# Patient Record
Sex: MALE | Race: WHITE | NOT HISPANIC OR LATINO | Employment: OTHER | URBAN - METROPOLITAN AREA
[De-identification: names, ages, dates, MRNs, and addresses within clinical notes are randomized per-mention and may not be internally consistent; named-entity substitution may affect disease eponyms.]

---

## 2023-04-07 ENCOUNTER — TELEPHONE (OUTPATIENT)
Dept: PRIMARY CARE | Facility: CLINIC | Age: 68
End: 2023-04-07
Payer: MEDICARE

## 2023-04-07 DIAGNOSIS — G47.33 OSA AND COPD OVERLAP SYNDROME (MULTI): Primary | ICD-10-CM

## 2023-04-07 DIAGNOSIS — J44.9 OSA AND COPD OVERLAP SYNDROME (MULTI): Primary | ICD-10-CM

## 2023-04-25 ENCOUNTER — TELEPHONE (OUTPATIENT)
Dept: PRIMARY CARE | Facility: CLINIC | Age: 68
End: 2023-04-25
Payer: MEDICARE

## 2023-04-25 DIAGNOSIS — E03.9 ACQUIRED HYPOTHYROIDISM: Primary | ICD-10-CM

## 2023-04-25 PROBLEM — I25.10 ATHEROSCLEROSIS OF CORONARY ARTERY: Status: ACTIVE | Noted: 2023-04-25

## 2023-04-25 PROBLEM — R05.9 COUGH: Status: ACTIVE | Noted: 2023-04-25

## 2023-04-25 PROBLEM — R09.82 PND (POST-NASAL DRIP): Status: ACTIVE | Noted: 2023-04-25

## 2023-04-25 PROBLEM — I51.9 LEFT VENTRICULAR DYSFUNCTION: Status: ACTIVE | Noted: 2023-04-25

## 2023-04-25 PROBLEM — E53.8 VITAMIN B12 DEFICIENCY: Status: ACTIVE | Noted: 2023-04-25

## 2023-04-25 PROBLEM — I34.0 NON-RHEUMATIC MITRAL REGURGITATION: Status: ACTIVE | Noted: 2023-04-25

## 2023-04-25 PROBLEM — J01.90 SINUSITIS, ACUTE: Status: ACTIVE | Noted: 2023-04-25

## 2023-04-25 PROBLEM — R73.02 IGT (IMPAIRED GLUCOSE TOLERANCE): Status: ACTIVE | Noted: 2023-04-25

## 2023-04-25 PROBLEM — N40.0 BPH WITHOUT URINARY OBSTRUCTION: Status: ACTIVE | Noted: 2023-04-25

## 2023-04-25 PROBLEM — E55.9 VITAMIN D DEFICIENCY: Status: ACTIVE | Noted: 2023-04-25

## 2023-04-25 PROBLEM — J43.2 CENTRILOBULAR EMPHYSEMA (MULTI): Status: ACTIVE | Noted: 2023-04-25

## 2023-04-25 PROBLEM — E78.5 DYSLIPIDEMIA, GOAL LDL BELOW 70: Status: ACTIVE | Noted: 2023-04-25

## 2023-04-25 PROBLEM — K21.9 CHRONIC GERD: Status: ACTIVE | Noted: 2023-04-25

## 2023-04-25 PROBLEM — M05.79 RHEUMATOID ARTHRITIS INVOLVING MULTIPLE SITES WITH POSITIVE RHEUMATOID FACTOR (MULTI): Status: ACTIVE | Noted: 2023-04-25

## 2023-04-25 RX ORDER — ASPIRIN 81 MG/1
1 TABLET ORAL DAILY
COMMUNITY
Start: 2017-02-16

## 2023-04-25 RX ORDER — IBANDRONATE SODIUM 150 MG/1
TABLET, FILM COATED ORAL
COMMUNITY
Start: 2021-08-18

## 2023-04-25 RX ORDER — OMEGA-3S/DHA/EPA/FISH OIL/D3 300MG-1000
150 CAPSULE ORAL DAILY
COMMUNITY
End: 2023-08-15 | Stop reason: SDUPTHER

## 2023-04-25 RX ORDER — ALBUTEROL SULFATE 90 UG/1
2 AEROSOL, METERED RESPIRATORY (INHALATION) EVERY 4 HOURS PRN
COMMUNITY
Start: 2017-02-16 | End: 2024-05-14 | Stop reason: SDUPTHER

## 2023-04-25 RX ORDER — FAMOTIDINE 40 MG/1
1 TABLET, FILM COATED ORAL NIGHTLY
COMMUNITY
Start: 2022-10-12 | End: 2023-05-11 | Stop reason: SDUPTHER

## 2023-04-25 RX ORDER — LEVOTHYROXINE SODIUM 25 UG/1
1 TABLET ORAL DAILY
COMMUNITY
Start: 2020-03-19 | End: 2023-04-25 | Stop reason: SDUPTHER

## 2023-04-25 RX ORDER — HYDROXYCHLOROQUINE SULFATE 200 MG/1
TABLET ORAL 2 TIMES DAILY
COMMUNITY
Start: 2017-02-16

## 2023-04-25 RX ORDER — METHOTREXATE 2.5 MG/1
15 TABLET ORAL
COMMUNITY
Start: 2017-02-16

## 2023-04-25 RX ORDER — LEVOTHYROXINE SODIUM 25 UG/1
25 TABLET ORAL DAILY
Qty: 90 TABLET | Refills: 3 | Status: SHIPPED | OUTPATIENT
Start: 2023-04-25 | End: 2024-05-14 | Stop reason: WASHOUT

## 2023-04-25 RX ORDER — FUROSEMIDE 20 MG/1
1 TABLET ORAL DAILY
COMMUNITY
Start: 2017-02-16 | End: 2023-05-11 | Stop reason: SDUPTHER

## 2023-04-25 RX ORDER — FOLIC ACID 1 MG/1
1 TABLET ORAL DAILY
COMMUNITY
Start: 2017-02-16

## 2023-04-25 RX ORDER — VITAMIN B COMPLEX
1 TABLET ORAL DAILY
COMMUNITY
End: 2023-08-11 | Stop reason: ALTCHOICE

## 2023-04-25 RX ORDER — NITROGLYCERIN 0.4 MG/1
TABLET SUBLINGUAL
COMMUNITY
Start: 2017-02-16 | End: 2023-05-11 | Stop reason: SDUPTHER

## 2023-04-25 RX ORDER — EZETIMIBE AND SIMVASTATIN 10; 40 MG/1; MG/1
1 TABLET ORAL NIGHTLY
COMMUNITY
Start: 2017-02-16 | End: 2023-08-11 | Stop reason: SDUPTHER

## 2023-04-25 RX ORDER — CARVEDILOL 12.5 MG/1
1 TABLET ORAL 2 TIMES DAILY
COMMUNITY
Start: 2017-02-16 | End: 2023-11-14 | Stop reason: SDUPTHER

## 2023-04-25 RX ORDER — FLUTICASONE FUROATE, UMECLIDINIUM BROMIDE AND VILANTEROL TRIFENATATE 200; 62.5; 25 UG/1; UG/1; UG/1
POWDER RESPIRATORY (INHALATION)
COMMUNITY
Start: 2021-11-22 | End: 2024-01-08 | Stop reason: SDUPTHER

## 2023-05-03 RX ORDER — LEVOTHYROXINE SODIUM 150 UG/1
150 TABLET ORAL DAILY
Qty: 90 TABLET | Refills: 3 | Status: SHIPPED | OUTPATIENT
Start: 2023-05-03 | End: 2024-01-08 | Stop reason: SDUPTHER

## 2023-05-03 NOTE — TELEPHONE ENCOUNTER
He needs a the Levothyroxine 150 mg takes it 1 time a day please send to [facility] mail order his pharmacy was not able to fill the other one cause he don't need that one right now.

## 2023-05-04 LAB
FERRITIN (UG/LL) IN SER/PLAS: 60 UG/L (ref 20–300)
IRON (UG/DL) IN SER/PLAS: 65 UG/DL (ref 35–150)
IRON BINDING CAPACITY (UG/DL) IN SER/PLAS: 318 UG/DL (ref 240–445)
IRON SATURATION (%) IN SER/PLAS: 20 % (ref 25–45)

## 2023-05-09 PROBLEM — G47.34 NOCTURNAL HYPOXEMIA: Status: ACTIVE | Noted: 2023-05-09

## 2023-05-09 PROBLEM — I25.10 CORONARY ARTERY DISEASE INVOLVING NATIVE CORONARY ARTERY OF NATIVE HEART WITHOUT ANGINA PECTORIS: Status: ACTIVE | Noted: 2023-05-09

## 2023-05-09 PROBLEM — G47.33 OSA (OBSTRUCTIVE SLEEP APNEA): Status: ACTIVE | Noted: 2023-05-09

## 2023-05-09 PROBLEM — G47.19 DAYTIME HYPERSOMNOLENCE: Status: ACTIVE | Noted: 2023-05-09

## 2023-05-09 PROBLEM — G25.81 RESTLESS LEGS SYNDROME: Status: ACTIVE | Noted: 2023-05-09

## 2023-05-09 PROBLEM — R79.0 ABNORMAL BLOOD LEVEL OF IRON: Status: ACTIVE | Noted: 2023-05-09

## 2023-05-09 RX ORDER — FLUTICASONE PROPIONATE 50 MCG
2 SPRAY, SUSPENSION (ML) NASAL DAILY
COMMUNITY
Start: 2021-11-22 | End: 2024-05-14 | Stop reason: SDUPTHER

## 2023-05-11 ENCOUNTER — OFFICE VISIT (OUTPATIENT)
Dept: PRIMARY CARE | Facility: CLINIC | Age: 68
End: 2023-05-11
Payer: COMMERCIAL

## 2023-05-11 ENCOUNTER — TELEPHONE (OUTPATIENT)
Dept: PRIMARY CARE | Facility: CLINIC | Age: 68
End: 2023-05-11

## 2023-05-11 VITALS
BODY MASS INDEX: 34.36 KG/M2 | DIASTOLIC BLOOD PRESSURE: 80 MMHG | WEIGHT: 240 LBS | RESPIRATION RATE: 16 BRPM | HEART RATE: 68 BPM | SYSTOLIC BLOOD PRESSURE: 130 MMHG | HEIGHT: 70 IN

## 2023-05-11 DIAGNOSIS — G47.19 DAYTIME HYPERSOMNOLENCE: ICD-10-CM

## 2023-05-11 DIAGNOSIS — J43.2 CENTRILOBULAR EMPHYSEMA (MULTI): ICD-10-CM

## 2023-05-11 DIAGNOSIS — G47.33 OSA (OBSTRUCTIVE SLEEP APNEA): Primary | ICD-10-CM

## 2023-05-11 DIAGNOSIS — G47.34 NOCTURNAL HYPOXEMIA: ICD-10-CM

## 2023-05-11 DIAGNOSIS — M05.79 RHEUMATOID ARTHRITIS INVOLVING MULTIPLE SITES WITH POSITIVE RHEUMATOID FACTOR (MULTI): ICD-10-CM

## 2023-05-11 DIAGNOSIS — I25.10 CORONARY ARTERY DISEASE INVOLVING NATIVE CORONARY ARTERY OF NATIVE HEART WITHOUT ANGINA PECTORIS: ICD-10-CM

## 2023-05-11 PROBLEM — R79.0 ABNORMAL BLOOD LEVEL OF IRON: Status: RESOLVED | Noted: 2023-05-09 | Resolved: 2023-05-11

## 2023-05-11 PROBLEM — J01.90 SINUSITIS, ACUTE: Status: RESOLVED | Noted: 2023-04-25 | Resolved: 2023-05-11

## 2023-05-11 PROBLEM — R05.9 COUGH: Status: RESOLVED | Noted: 2023-04-25 | Resolved: 2023-05-11

## 2023-05-11 PROBLEM — G25.81 RESTLESS LEGS SYNDROME: Status: RESOLVED | Noted: 2023-05-09 | Resolved: 2023-05-11

## 2023-05-11 PROCEDURE — 99213 OFFICE O/P EST LOW 20 MIN: CPT | Performed by: INTERNAL MEDICINE

## 2023-05-11 PROCEDURE — 3008F BODY MASS INDEX DOCD: CPT | Performed by: INTERNAL MEDICINE

## 2023-05-11 PROCEDURE — 1036F TOBACCO NON-USER: CPT | Performed by: INTERNAL MEDICINE

## 2023-05-11 PROCEDURE — 1160F RVW MEDS BY RX/DR IN RCRD: CPT | Performed by: INTERNAL MEDICINE

## 2023-05-11 PROCEDURE — 1159F MED LIST DOCD IN RCRD: CPT | Performed by: INTERNAL MEDICINE

## 2023-05-11 RX ORDER — NITROGLYCERIN 0.4 MG/1
0.4 TABLET SUBLINGUAL EVERY 5 MIN PRN
Qty: 90 TABLET | Refills: 3 | Status: CANCELLED | OUTPATIENT
Start: 2023-05-11

## 2023-05-11 RX ORDER — FUROSEMIDE 20 MG/1
20 TABLET ORAL DAILY
Qty: 90 TABLET | Refills: 3 | Status: SHIPPED | OUTPATIENT
Start: 2023-05-11

## 2023-05-11 RX ORDER — FAMOTIDINE 40 MG/1
40 TABLET, FILM COATED ORAL NIGHTLY
Qty: 90 TABLET | Refills: 3 | Status: SHIPPED | OUTPATIENT
Start: 2023-05-11 | End: 2024-05-14 | Stop reason: SDUPTHER

## 2023-05-11 RX ORDER — FAMOTIDINE 40 MG/1
40 TABLET, FILM COATED ORAL NIGHTLY
Qty: 90 TABLET | Refills: 3 | Status: CANCELLED | OUTPATIENT
Start: 2023-05-11

## 2023-05-11 RX ORDER — FUROSEMIDE 20 MG/1
20 TABLET ORAL DAILY
Qty: 90 TABLET | Refills: 3 | Status: CANCELLED | OUTPATIENT
Start: 2023-05-11

## 2023-05-11 RX ORDER — NITROGLYCERIN 0.4 MG/1
0.4 TABLET SUBLINGUAL EVERY 5 MIN PRN
Qty: 90 TABLET | Refills: 3 | Status: SHIPPED | OUTPATIENT
Start: 2023-05-11 | End: 2024-05-14 | Stop reason: WASHOUT

## 2023-05-11 ASSESSMENT — ANXIETY QUESTIONNAIRES
6. BECOMING EASILY ANNOYED OR IRRITABLE: NOT AT ALL
IF YOU CHECKED OFF ANY PROBLEMS ON THIS QUESTIONNAIRE, HOW DIFFICULT HAVE THESE PROBLEMS MADE IT FOR YOU TO DO YOUR WORK, TAKE CARE OF THINGS AT HOME, OR GET ALONG WITH OTHER PEOPLE: NOT DIFFICULT AT ALL
7. FEELING AFRAID AS IF SOMETHING AWFUL MIGHT HAPPEN: NOT AT ALL
2. NOT BEING ABLE TO STOP OR CONTROL WORRYING: NOT AT ALL
4. TROUBLE RELAXING: NOT AT ALL
GAD7 TOTAL SCORE: 0
5. BEING SO RESTLESS THAT IT IS HARD TO SIT STILL: NOT AT ALL
3. WORRYING TOO MUCH ABOUT DIFFERENT THINGS: NOT AT ALL
1. FEELING NERVOUS, ANXIOUS, OR ON EDGE: NOT AT ALL

## 2023-05-11 ASSESSMENT — PATIENT HEALTH QUESTIONNAIRE - PHQ9
1. LITTLE INTEREST OR PLEASURE IN DOING THINGS: NOT AT ALL
2. FEELING DOWN, DEPRESSED OR HOPELESS: NOT AT ALL
SUM OF ALL RESPONSES TO PHQ9 QUESTIONS 1 AND 2: 0

## 2023-05-11 ASSESSMENT — ENCOUNTER SYMPTOMS
OCCASIONAL FEELINGS OF UNSTEADINESS: 0
DEPRESSION: 0
LOSS OF SENSATION IN FEET: 0

## 2023-05-11 NOTE — PROGRESS NOTES
"Subjective   Patient ID: Artemio Powell is a 68 y.o. male who presents for Follow-up and sleep study results .    HPI     Review of Systems    Objective   /80   Pulse 68   Resp 16   Ht 1.778 m (5' 10\")   Wt 109 kg (240 lb)   BMI 34.44 kg/m²     Physical Exam    Assessment/Plan          "

## 2023-05-11 NOTE — PROGRESS NOTES
"Subjective   Reason for Visit: Artemio Powell is an 68 y.o. male here for a  visit.     Past Medical, Surgical, and Family History reviewed and updated in chart.    Reviewed all medications by prescribing practitioner or clinical pharmacist (such as prescriptions, OTCs, herbal therapies and supplements) and documented in the medical record.    History of daytime hypersomnolence with established COPD patient sent for sleep study patient has significant sleep related hypoxia and consistent with a diagnosis of moderate obstructive sleep apnea respiratory events were worse in REM sleep and supine sleep with the katie oxygen of 82% requiring patient to have 1 L nasal cannula bleeding patient will be scheduled for CPAP he may require a titration study or may benefit from a formal in-house sleep study he was screened for restless legs but is more periodic limb movement disorder related to sleep apnea iron levels were within normal limits as well as ferritin levels        Patient Care Team:  David Michaels DO as PCP - General     Review of Systems   All other systems reviewed and are negative.      Objective   Vitals:  /80   Pulse 68   Resp 16   Ht 1.778 m (5' 10\")   Wt 109 kg (240 lb)   BMI 34.44 kg/m²       Physical Exam  Vitals and nursing note reviewed.   Constitutional:       General: He is not in acute distress.     Appearance: Normal appearance. He is well-developed. He is not toxic-appearing.   HENT:      Head: Normocephalic and atraumatic.      Right Ear: Tympanic membrane and external ear normal.      Left Ear: Tympanic membrane and external ear normal.      Nose: Nose normal.      Mouth/Throat:      Mouth: Mucous membranes are moist.      Pharynx: Oropharynx is clear. No oropharyngeal exudate or posterior oropharyngeal erythema.      Tonsils: No tonsillar exudate. 2+ on the right. 2+ on the left.   Eyes:      Extraocular Movements: Extraocular movements intact.      Conjunctiva/sclera: " Conjunctivae normal.   Cardiovascular:      Rate and Rhythm: Normal rate and regular rhythm.      Pulses: Normal pulses.      Heart sounds: Normal heart sounds. No murmur heard.  Pulmonary:      Effort: Pulmonary effort is normal.      Breath sounds: Normal breath sounds.   Abdominal:      General: Abdomen is flat. Bowel sounds are normal.      Palpations: Abdomen is soft.   Musculoskeletal:      Cervical back: Neck supple.   Feet:      Right foot:      Skin integrity: Skin integrity normal. No ulcer, blister, skin breakdown, erythema, warmth or callus.      Toenail Condition: Right toenails are normal.      Left foot:      Skin integrity: Skin integrity normal. No ulcer, blister, skin breakdown, erythema, warmth or callus.      Toenail Condition: Left toenails are normal.   Lymphadenopathy:      Cervical: No cervical adenopathy.   Skin:     General: Skin is warm and dry.      Capillary Refill: Capillary refill takes more than 3 seconds.      Findings: No rash.   Neurological:      Mental Status: He is alert. Mental status is at baseline.      Sensory: Sensation is intact.   Psychiatric:         Mood and Affect: Mood normal.         Behavior: Behavior normal.         Thought Content: Thought content normal.         Judgment: Judgment normal.         Assessment/Plan   Problem List Items Addressed This Visit          Nervous    Daytime hypersomnolence    RAYMUNDO (obstructive sleep apnea) - Primary    Current Assessment & Plan     Schedule an AutoPap of 5-20 with 1 L nasal cannula O2 bleed in his should improve periodic limb movement disorder reevaluate in 3 months            Respiratory    Centrilobular emphysema (CMS/HCC)    Current Assessment & Plan     Stable at this time continue therapy with inhalers         Relevant Medications    famotidine (Pepcid) 40 mg tablet    furosemide (Lasix) 20 mg tablet    nitroglycerin (Nitrostat) 0.4 mg SL tablet    Other Relevant Orders    Disability Placard       Circulatory     Coronary artery disease involving native coronary artery of native heart without angina pectoris    Current Assessment & Plan     Stable blood pressures stable without angina          Relevant Medications    nitroglycerin (Nitrostat) 0.4 mg SL tablet       Other    Rheumatoid arthritis involving multiple sites with positive rheumatoid factor (CMS/[facility])    Current Assessment & Plan     Managed by rheumatology with monitoring of labs on Renflexis 100 mg injection         Relevant Medications    famotidine (Pepcid) 40 mg tablet    furosemide (Lasix) 20 mg tablet    nitroglycerin (Nitrostat) 0.4 mg SL tablet    Other Relevant Orders    Disability Placard

## 2023-05-11 NOTE — ASSESSMENT & PLAN NOTE
Schedule an AutoPap of 5-20 with 1 L nasal cannula O2 bleed in his should improve periodic limb movement disorder reevaluate in 3 months

## 2023-05-11 NOTE — TELEPHONE ENCOUNTER
Fina from [facility] called states that if oxygen is needed to bled through the cpap machine patient has to have a cpap titration done. But if no oxygen is needed then they can get him set up with pap.

## 2023-08-11 ENCOUNTER — OFFICE VISIT (OUTPATIENT)
Dept: PRIMARY CARE | Facility: CLINIC | Age: 68
End: 2023-08-11
Payer: COMMERCIAL

## 2023-08-11 VITALS
SYSTOLIC BLOOD PRESSURE: 124 MMHG | BODY MASS INDEX: 34.88 KG/M2 | WEIGHT: 243.6 LBS | OXYGEN SATURATION: 92 % | HEART RATE: 72 BPM | HEIGHT: 70 IN | DIASTOLIC BLOOD PRESSURE: 80 MMHG

## 2023-08-11 DIAGNOSIS — I11.0 HYPERTENSIVE HEART DISEASE WITH CHRONIC COMBINED SYSTOLIC AND DIASTOLIC CONGESTIVE HEART FAILURE (MULTI): ICD-10-CM

## 2023-08-11 DIAGNOSIS — E06.3 HYPOTHYROIDISM DUE TO HASHIMOTO'S THYROIDITIS: ICD-10-CM

## 2023-08-11 DIAGNOSIS — E66.09 CLASS 1 OBESITY DUE TO EXCESS CALORIES WITH SERIOUS COMORBIDITY AND BODY MASS INDEX (BMI) OF 34.0 TO 34.9 IN ADULT: ICD-10-CM

## 2023-08-11 DIAGNOSIS — E03.8 HYPOTHYROIDISM DUE TO HASHIMOTO'S THYROIDITIS: ICD-10-CM

## 2023-08-11 DIAGNOSIS — I25.10 CORONARY ARTERY DISEASE INVOLVING NATIVE CORONARY ARTERY OF NATIVE HEART WITHOUT ANGINA PECTORIS: ICD-10-CM

## 2023-08-11 DIAGNOSIS — I50.42 HYPERTENSIVE HEART DISEASE WITH CHRONIC COMBINED SYSTOLIC AND DIASTOLIC CONGESTIVE HEART FAILURE (MULTI): ICD-10-CM

## 2023-08-11 DIAGNOSIS — G47.33 OSA ON CPAP: ICD-10-CM

## 2023-08-11 DIAGNOSIS — E55.9 VITAMIN D DEFICIENCY: ICD-10-CM

## 2023-08-11 DIAGNOSIS — J44.9 OSA AND COPD OVERLAP SYNDROME (MULTI): Primary | ICD-10-CM

## 2023-08-11 DIAGNOSIS — M05.79 RHEUMATOID ARTHRITIS INVOLVING MULTIPLE SITES WITH POSITIVE RHEUMATOID FACTOR (MULTI): ICD-10-CM

## 2023-08-11 DIAGNOSIS — R73.02 IGT (IMPAIRED GLUCOSE TOLERANCE): ICD-10-CM

## 2023-08-11 DIAGNOSIS — G47.33 OSA AND COPD OVERLAP SYNDROME (MULTI): Primary | ICD-10-CM

## 2023-08-11 DIAGNOSIS — J43.2 CENTRILOBULAR EMPHYSEMA (MULTI): ICD-10-CM

## 2023-08-11 DIAGNOSIS — E78.5 DYSLIPIDEMIA, GOAL LDL BELOW 70: ICD-10-CM

## 2023-08-11 DIAGNOSIS — E53.8 VITAMIN B12 DEFICIENCY: ICD-10-CM

## 2023-08-11 PROBLEM — G47.34 NOCTURNAL HYPOXEMIA: Status: RESOLVED | Noted: 2023-05-09 | Resolved: 2023-08-11

## 2023-08-11 PROBLEM — E66.9 CLASS 1 OBESITY WITH SERIOUS COMORBIDITY AND BODY MASS INDEX (BMI) OF 34.0 TO 34.9 IN ADULT: Status: ACTIVE | Noted: 2023-08-11

## 2023-08-11 PROBLEM — G47.19 DAYTIME HYPERSOMNOLENCE: Status: RESOLVED | Noted: 2023-05-09 | Resolved: 2023-08-11

## 2023-08-11 PROBLEM — E66.811 CLASS 1 OBESITY WITH SERIOUS COMORBIDITY AND BODY MASS INDEX (BMI) OF 34.0 TO 34.9 IN ADULT: Status: ACTIVE | Noted: 2023-08-11

## 2023-08-11 PROBLEM — I51.9 LEFT VENTRICULAR DYSFUNCTION: Status: RESOLVED | Noted: 2023-04-25 | Resolved: 2023-08-11

## 2023-08-11 PROCEDURE — 1159F MED LIST DOCD IN RCRD: CPT | Performed by: INTERNAL MEDICINE

## 2023-08-11 PROCEDURE — 90677 PCV20 VACCINE IM: CPT | Performed by: INTERNAL MEDICINE

## 2023-08-11 PROCEDURE — 3008F BODY MASS INDEX DOCD: CPT | Performed by: INTERNAL MEDICINE

## 2023-08-11 PROCEDURE — 99214 OFFICE O/P EST MOD 30 MIN: CPT | Performed by: INTERNAL MEDICINE

## 2023-08-11 PROCEDURE — 90750 HZV VACC RECOMBINANT IM: CPT | Performed by: INTERNAL MEDICINE

## 2023-08-11 PROCEDURE — 90472 IMMUNIZATION ADMIN EACH ADD: CPT | Performed by: INTERNAL MEDICINE

## 2023-08-11 PROCEDURE — 1036F TOBACCO NON-USER: CPT | Performed by: INTERNAL MEDICINE

## 2023-08-11 PROCEDURE — 1160F RVW MEDS BY RX/DR IN RCRD: CPT | Performed by: INTERNAL MEDICINE

## 2023-08-11 PROCEDURE — 90471 IMMUNIZATION ADMIN: CPT | Performed by: INTERNAL MEDICINE

## 2023-08-11 RX ORDER — LANOLIN ALCOHOL/MO/W.PET/CERES
1000 CREAM (GRAM) TOPICAL DAILY
Qty: 90 TABLET | Refills: 3 | Status: SHIPPED | OUTPATIENT
Start: 2023-08-11 | End: 2023-08-24 | Stop reason: SDUPTHER

## 2023-08-11 RX ORDER — EZETIMIBE AND SIMVASTATIN 10; 40 MG/1; MG/1
1 TABLET ORAL NIGHTLY
Qty: 90 TABLET | Refills: 3 | Status: SHIPPED | OUTPATIENT
Start: 2023-08-11 | End: 2024-08-10

## 2023-08-11 ASSESSMENT — PATIENT HEALTH QUESTIONNAIRE - PHQ9
2. FEELING DOWN, DEPRESSED OR HOPELESS: NOT AT ALL
1. LITTLE INTEREST OR PLEASURE IN DOING THINGS: NOT AT ALL
SUM OF ALL RESPONSES TO PHQ9 QUESTIONS 1 AND 2: 0

## 2023-08-11 ASSESSMENT — ANXIETY QUESTIONNAIRES
1. FEELING NERVOUS, ANXIOUS, OR ON EDGE: NOT AT ALL
6. BECOMING EASILY ANNOYED OR IRRITABLE: NOT AT ALL
GAD7 TOTAL SCORE: 0
5. BEING SO RESTLESS THAT IT IS HARD TO SIT STILL: NOT AT ALL
3. WORRYING TOO MUCH ABOUT DIFFERENT THINGS: NOT AT ALL
2. NOT BEING ABLE TO STOP OR CONTROL WORRYING: NOT AT ALL
4. TROUBLE RELAXING: NOT AT ALL
IF YOU CHECKED OFF ANY PROBLEMS ON THIS QUESTIONNAIRE, HOW DIFFICULT HAVE THESE PROBLEMS MADE IT FOR YOU TO DO YOUR WORK, TAKE CARE OF THINGS AT HOME, OR GET ALONG WITH OTHER PEOPLE: NOT DIFFICULT AT ALL
7. FEELING AFRAID AS IF SOMETHING AWFUL MIGHT HAPPEN: NOT AT ALL

## 2023-08-11 ASSESSMENT — ENCOUNTER SYMPTOMS
OCCASIONAL FEELINGS OF UNSTEADINESS: 0
SHORTNESS OF BREATH: 1
DEPRESSION: 0
LOSS OF SENSATION IN FEET: 0

## 2023-08-11 ASSESSMENT — COLUMBIA-SUICIDE SEVERITY RATING SCALE - C-SSRS
1. IN THE PAST MONTH, HAVE YOU WISHED YOU WERE DEAD OR WISHED YOU COULD GO TO SLEEP AND NOT WAKE UP?: NO
6. HAVE YOU EVER DONE ANYTHING, STARTED TO DO ANYTHING, OR PREPARED TO DO ANYTHING TO END YOUR LIFE?: NO
2. HAVE YOU ACTUALLY HAD ANY THOUGHTS OF KILLING YOURSELF?: NO

## 2023-08-11 ASSESSMENT — COPD QUESTIONNAIRES: COPD: 1

## 2023-08-11 NOTE — ASSESSMENT & PLAN NOTE
Patient continues on Trelegy inhaler once a day with bronchodilator therapy for rescue review previous PFTs and reorder PFTs with 6-minute exercise test to evaluate further exertional dyspnea related to respiratory cause.  Did update Prevnar 20 vaccination and first Shingrix vaccination in office today reevaluate in 3 months

## 2023-08-11 NOTE — ASSESSMENT & PLAN NOTE
Well compensated on carvedilol 12.5 mg twice a day and furosemide 20 mg daily discontinued ACE inhibitor secondary to low blood pressure continue to monitor closely

## 2023-08-11 NOTE — ASSESSMENT & PLAN NOTE
Stable continue methotrexate with hydroxychloroquine and Renflexis infusion managed by rheumatologist no active synovitis at this time stable.  Prevnar 20 vaccine and Shingrix vaccine updated in office today

## 2023-08-11 NOTE — ASSESSMENT & PLAN NOTE
Reevaluate vitamin D therapy in the setting of treatment for osteoporosis with Boniva   not applicable (Male)

## 2023-08-11 NOTE — PROGRESS NOTES
"Subjective   Reason for Visit: Artemio Powell is an 68 y.o. male here for a Medicare Wellness visit.     Past Medical, Surgical, and Family History reviewed and updated in chart.    Reviewed all medications by prescribing practitioner or clinical pharmacist (such as prescriptions, OTCs, herbal therapies and supplements) and documented in the medical record.    Patient continues to exhibit symptoms of exertional shortness of breath no evidence of congestive heart failure longstanding history of emphysema COPD no exacerbation at this time denies recent illness denies productive cough chest discomfort gets easily winded with activity despite optimal use of Trelegy no evidence of PND orthopnea on exam no evidence of congestion    Shortness of Breath  This is a chronic problem. The current episode started more than 1 year ago. The problem occurs constantly. The problem has been gradually worsening. The average episode lasts 10 years. The symptoms are aggravated by any activity and weather changes. He has tried steroid inhalers, leukotriene antagonists and beta agonist inhalers for the symptoms. The treatment provided moderate relief. His past medical history is significant for CAD, chronic lung disease, COPD, a heart failure and pneumonia.       Patient Care Team:  David Michaels DO as PCP - General     Review of Systems   Respiratory:  Positive for shortness of breath.    All other systems reviewed and are negative.      Objective   Vitals:  /80   Pulse 72   Ht 1.778 m (5' 10\")   Wt 110 kg (243 lb 9.6 oz)   SpO2 92%   BMI 34.95 kg/m²       Physical Exam  Vitals and nursing note reviewed.   Constitutional:       General: He is not in acute distress.     Appearance: Normal appearance. He is well-developed. He is not toxic-appearing.   HENT:      Head: Normocephalic and atraumatic.      Right Ear: Tympanic membrane and external ear normal.      Left Ear: Tympanic membrane and external ear normal.      " Nose: Nose normal.      Mouth/Throat:      Mouth: Mucous membranes are moist.      Pharynx: Oropharynx is clear. No oropharyngeal exudate or posterior oropharyngeal erythema.      Tonsils: No tonsillar exudate. 2+ on the right. 2+ on the left.   Eyes:      Extraocular Movements: Extraocular movements intact.      Conjunctiva/sclera: Conjunctivae normal.   Cardiovascular:      Rate and Rhythm: Normal rate and regular rhythm.      Pulses: Normal pulses.      Heart sounds: Normal heart sounds. No murmur heard.  Pulmonary:      Effort: Pulmonary effort is normal.      Breath sounds: Normal breath sounds.   Abdominal:      General: Abdomen is flat. Bowel sounds are normal.      Palpations: Abdomen is soft.   Musculoskeletal:      Cervical back: Neck supple.   Feet:      Right foot:      Skin integrity: Skin integrity normal. No ulcer, blister, skin breakdown, erythema, warmth or callus.      Toenail Condition: Right toenails are normal.      Left foot:      Skin integrity: Skin integrity normal. No ulcer, blister, skin breakdown, erythema, warmth or callus.      Toenail Condition: Left toenails are normal.   Lymphadenopathy:      Cervical: No cervical adenopathy.   Skin:     General: Skin is warm and dry.      Capillary Refill: Capillary refill takes more than 3 seconds.      Findings: No rash.   Neurological:      Mental Status: He is alert. Mental status is at baseline.      Sensory: Sensation is intact.   Psychiatric:         Mood and Affect: Mood normal.         Behavior: Behavior normal.         Thought Content: Thought content normal.         Judgment: Judgment normal.         Assessment/Plan   Problem List Items Addressed This Visit       Centrilobular emphysema (CMS/HCC)    Overview     PFTs reviewed from March 2, 2018 FEV1 59% FEV1 over FVC is 82% patient with moderately severe obstructive airways disease with severe diffusion defect with concomitant restriction related to scarring and fibrosis changes in the  setting of rheumatoid arthritis and obstructive sleep apnea in the setting of morbid obesity DLCO lower at 49%  Chest x-ray noted November 2022 bilateral emphysematous changes with interstitial component and scarring suggestive of an ILD         Current Assessment & Plan     Patient continues on Trelegy inhaler once a day with bronchodilator therapy for rescue review previous PFTs and reorder PFTs with 6-minute exercise test to evaluate further exertional dyspnea related to respiratory cause.  Did update Prevnar 20 vaccination and first Shingrix vaccination in office today reevaluate in 3 months         Relevant Medications    ezetimibe-simvastatin (Vytorin) 10-40 mg tablet    cyanocobalamin (Vitamin B-12) 1,000 mcg tablet    Other Relevant Orders    Comprehensive Metabolic Panel    Hemoglobin A1C    Lipid Panel    Albumin , Urine Random    Hepatitis C antibody    Vitamin D 25-Hydroxy,Total    Tsh With Reflex To Free T4 If Abnormal    Vitamin B12    Pulmonary function testing (Ancillary Performed)    Dyslipidemia, goal LDL below 70    Current Assessment & Plan     Refilled Vytorin 10/40 1 tablet daily reevaluate lipid profile LDL goal less than 70         Hypothyroidism    Current Assessment & Plan     Clinically euthyroid continue 175 micrograms of levothyroxine daily reevaluate profile         IGT (impaired glucose tolerance)    Current Assessment & Plan     Reevaluate lab work for insulin resistance and cardiometabolic risk         Rheumatoid arthritis involving multiple sites with positive rheumatoid factor (CMS/HCC)    Current Assessment & Plan     Stable continue methotrexate with hydroxychloroquine and Renflexis infusion managed by rheumatologist no active synovitis at this time stable.  Prevnar 20 vaccine and Shingrix vaccine updated in office today         Relevant Medications    ezetimibe-simvastatin (Vytorin) 10-40 mg tablet    cyanocobalamin (Vitamin B-12) 1,000 mcg tablet    Other Relevant Orders     Comprehensive Metabolic Panel    Hemoglobin A1C    Lipid Panel    Albumin , Urine Random    Hepatitis C antibody    Vitamin D 25-Hydroxy,Total    Tsh With Reflex To Free T4 If Abnormal    Vitamin B12    Pulmonary function testing (Ancillary Performed)    Vitamin B12 deficiency    Current Assessment & Plan     Improvement repeat vitamin B-12 levels refilled oral vitamin B12 1000 mcg daily         Vitamin D deficiency    Current Assessment & Plan     Reevaluate vitamin D therapy in the setting of treatment for osteoporosis with Boniva         RAYMUNDO and COPD overlap syndrome (CMS/HCC) - Primary    Current Assessment & Plan     Optimize respiratory care         Relevant Medications    ezetimibe-simvastatin (Vytorin) 10-40 mg tablet    cyanocobalamin (Vitamin B-12) 1,000 mcg tablet    Other Relevant Orders    Comprehensive Metabolic Panel    Hemoglobin A1C    Lipid Panel    Albumin , Urine Random    Hepatitis C antibody    Vitamin D 25-Hydroxy,Total    Tsh With Reflex To Free T4 If Abnormal    Vitamin B12    Pulmonary function testing (Ancillary Performed)    Coronary artery disease involving native coronary artery of native heart without angina pectoris    Current Assessment & Plan     Well compensated on carvedilol 12.5 mg twice a day and furosemide 20 mg daily discontinued ACE inhibitor secondary to low blood pressure continue to monitor closely         Relevant Medications    ezetimibe-simvastatin (Vytorin) 10-40 mg tablet    cyanocobalamin (Vitamin B-12) 1,000 mcg tablet    Other Relevant Orders    Comprehensive Metabolic Panel    Hemoglobin A1C    Lipid Panel    Albumin , Urine Random    Hepatitis C antibody    Vitamin D 25-Hydroxy,Total    Tsh With Reflex To Free T4 If Abnormal    Vitamin B12    Pulmonary function testing (Ancillary Performed)    RAYMUNDO on CPAP    Current Assessment & Plan     Patient with mild anxiousness with initiation of CPAP with starting at night may benefit from ramping versus small dose of  zolpidem if anxiety symptoms continue otherwise slowly tolerating CPAP once started with improvement in daytime hypersomnolence and breathing at night         Relevant Medications    ezetimibe-simvastatin (Vytorin) 10-40 mg tablet    cyanocobalamin (Vitamin B-12) 1,000 mcg tablet    Other Relevant Orders    Comprehensive Metabolic Panel    Hemoglobin A1C    Lipid Panel    Albumin , Urine Random    Hepatitis C antibody    Vitamin D 25-Hydroxy,Total    Tsh With Reflex To Free T4 If Abnormal    Vitamin B12    Pulmonary function testing (Ancillary Performed)    Class 1 obesity with serious comorbidity and body mass index (BMI) of 34.0 to 34.9 in adult    Hypertensive heart disease with chronic combined systolic and diastolic congestive heart failure (CMS/HCC)    Overview     Last echocardiogram completed July 8, 2011 status post MI stent severe hypokinetic apical anterior septal and inferoseptal segments with mild LV dysfunction with ejection fraction 45% stage I diastolic dysfunction no significant valvulopathy         Current Assessment & Plan     History of MI with stenting past with low normal ejection fraction improved with medication continue annual evaluation with cardiologist review most recent echocardiogram well compensated at this time off ACE inhibitor secondary to low BP with dizziness             Compliances:'s are fucking much can

## 2023-08-11 NOTE — ASSESSMENT & PLAN NOTE
History of MI with stenting past with low normal ejection fraction improved with medication continue annual evaluation with cardiologist review most recent echocardiogram well compensated at this time off ACE inhibitor secondary to low BP with dizziness

## 2023-08-11 NOTE — ASSESSMENT & PLAN NOTE
Patient with mild anxiousness with initiation of CPAP with starting at night may benefit from ramping versus small dose of zolpidem if anxiety symptoms continue otherwise slowly tolerating CPAP once started with improvement in daytime hypersomnolence and breathing at night

## 2023-08-15 ENCOUNTER — TELEPHONE (OUTPATIENT)
Dept: PRIMARY CARE | Facility: CLINIC | Age: 68
End: 2023-08-15
Payer: COMMERCIAL

## 2023-08-15 DIAGNOSIS — E55.9 VITAMIN D DEFICIENCY: ICD-10-CM

## 2023-08-15 RX ORDER — CHOLECALCIFEROL (VITAMIN D3) 50 MCG
150 TABLET ORAL DAILY
Qty: 90 TABLET | Refills: 3 | Status: SHIPPED | OUTPATIENT
Start: 2023-08-15

## 2023-08-24 ENCOUNTER — TELEPHONE (OUTPATIENT)
Dept: PRIMARY CARE | Facility: CLINIC | Age: 68
End: 2023-08-24

## 2023-08-24 ENCOUNTER — APPOINTMENT (OUTPATIENT)
Dept: PRIMARY CARE | Facility: CLINIC | Age: 68
End: 2023-08-24
Payer: MEDICARE

## 2023-08-24 DIAGNOSIS — G47.33 OSA ON CPAP: ICD-10-CM

## 2023-08-24 DIAGNOSIS — J43.2 CENTRILOBULAR EMPHYSEMA (MULTI): ICD-10-CM

## 2023-08-24 DIAGNOSIS — M05.79 RHEUMATOID ARTHRITIS INVOLVING MULTIPLE SITES WITH POSITIVE RHEUMATOID FACTOR (MULTI): ICD-10-CM

## 2023-08-24 DIAGNOSIS — G47.33 OSA AND COPD OVERLAP SYNDROME (MULTI): ICD-10-CM

## 2023-08-24 DIAGNOSIS — I25.10 CORONARY ARTERY DISEASE INVOLVING NATIVE CORONARY ARTERY OF NATIVE HEART WITHOUT ANGINA PECTORIS: ICD-10-CM

## 2023-08-24 DIAGNOSIS — J44.9 OSA AND COPD OVERLAP SYNDROME (MULTI): ICD-10-CM

## 2023-08-24 RX ORDER — LANOLIN ALCOHOL/MO/W.PET/CERES
1000 CREAM (GRAM) TOPICAL DAILY
Qty: 90 TABLET | Refills: 3 | Status: SHIPPED | OUTPATIENT
Start: 2023-08-24 | End: 2024-05-14 | Stop reason: SDUPTHER

## 2023-08-24 NOTE — TELEPHONE ENCOUNTER
Vitamin B-12 was sent to [facility] and patient said they won't fill. Can you send to [facility]. Thanks!

## 2023-10-24 ENCOUNTER — LAB (OUTPATIENT)
Dept: LAB | Facility: LAB | Age: 68
End: 2023-10-24
Payer: COMMERCIAL

## 2023-10-24 DIAGNOSIS — M05.79 RHEUMATOID ARTHRITIS INVOLVING MULTIPLE SITES WITH POSITIVE RHEUMATOID FACTOR (MULTI): ICD-10-CM

## 2023-10-24 DIAGNOSIS — I25.10 CORONARY ARTERY DISEASE INVOLVING NATIVE CORONARY ARTERY OF NATIVE HEART WITHOUT ANGINA PECTORIS: ICD-10-CM

## 2023-10-24 DIAGNOSIS — G47.33 OSA AND COPD OVERLAP SYNDROME (MULTI): ICD-10-CM

## 2023-10-24 DIAGNOSIS — J43.2 CENTRILOBULAR EMPHYSEMA (MULTI): ICD-10-CM

## 2023-10-24 DIAGNOSIS — G47.33 OSA ON CPAP: ICD-10-CM

## 2023-10-24 DIAGNOSIS — J44.9 OSA AND COPD OVERLAP SYNDROME (MULTI): ICD-10-CM

## 2023-10-24 LAB
25(OH)D3 SERPL-MCNC: 31 NG/ML (ref 30–100)
ALBUMIN SERPL BCP-MCNC: 3.8 G/DL (ref 3.4–5)
ALP SERPL-CCNC: 81 U/L (ref 33–136)
ALT SERPL W P-5'-P-CCNC: 15 U/L (ref 10–52)
ANION GAP SERPL CALC-SCNC: 11 MMOL/L (ref 10–20)
AST SERPL W P-5'-P-CCNC: 19 U/L (ref 9–39)
BILIRUB SERPL-MCNC: 0.4 MG/DL (ref 0–1.2)
BUN SERPL-MCNC: 13 MG/DL (ref 6–23)
CALCIUM SERPL-MCNC: 8.7 MG/DL (ref 8.6–10.3)
CHLORIDE SERPL-SCNC: 105 MMOL/L (ref 98–107)
CHOLEST SERPL-MCNC: 105 MG/DL (ref 0–199)
CHOLESTEROL/HDL RATIO: 3.1
CO2 SERPL-SCNC: 28 MMOL/L (ref 21–32)
CREAT SERPL-MCNC: 0.96 MG/DL (ref 0.5–1.3)
CREAT UR-MCNC: 82.1 MG/DL (ref 20–370)
EST. AVERAGE GLUCOSE BLD GHB EST-MCNC: 134 MG/DL
GFR SERPL CREATININE-BSD FRML MDRD: 85 ML/MIN/1.73M*2
GLUCOSE SERPL-MCNC: 91 MG/DL (ref 74–99)
HBA1C MFR BLD: 6.3 %
HCV AB SER QL: NONREACTIVE
HDLC SERPL-MCNC: 33.8 MG/DL
LDLC SERPL CALC-MCNC: 46 MG/DL
MICROALBUMIN UR-MCNC: <7 MG/L
MICROALBUMIN/CREAT UR: NORMAL MG/G{CREAT}
NON HDL CHOLESTEROL: 71 MG/DL (ref 0–149)
POTASSIUM SERPL-SCNC: 4.5 MMOL/L (ref 3.5–5.3)
PROT SERPL-MCNC: 6.4 G/DL (ref 6.4–8.2)
SODIUM SERPL-SCNC: 139 MMOL/L (ref 136–145)
TRIGL SERPL-MCNC: 124 MG/DL (ref 0–149)
TSH SERPL-ACNC: 1.85 MIU/L (ref 0.44–3.98)
VIT B12 SERPL-MCNC: 663 PG/ML (ref 211–911)
VLDL: 25 MG/DL (ref 0–40)

## 2023-10-24 PROCEDURE — 83036 HEMOGLOBIN GLYCOSYLATED A1C: CPT

## 2023-10-24 PROCEDURE — 80053 COMPREHEN METABOLIC PANEL: CPT

## 2023-10-24 PROCEDURE — 82607 VITAMIN B-12: CPT

## 2023-10-24 PROCEDURE — 80061 LIPID PANEL: CPT

## 2023-10-24 PROCEDURE — 84443 ASSAY THYROID STIM HORMONE: CPT

## 2023-10-24 PROCEDURE — 82306 VITAMIN D 25 HYDROXY: CPT

## 2023-10-24 PROCEDURE — 82570 ASSAY OF URINE CREATININE: CPT

## 2023-10-24 PROCEDURE — 36415 COLL VENOUS BLD VENIPUNCTURE: CPT

## 2023-10-24 PROCEDURE — 82043 UR ALBUMIN QUANTITATIVE: CPT

## 2023-10-24 PROCEDURE — 86803 HEPATITIS C AB TEST: CPT

## 2023-11-14 ENCOUNTER — OFFICE VISIT (OUTPATIENT)
Dept: PRIMARY CARE | Facility: CLINIC | Age: 68
End: 2023-11-14
Payer: COMMERCIAL

## 2023-11-14 VITALS
BODY MASS INDEX: 35.25 KG/M2 | SYSTOLIC BLOOD PRESSURE: 112 MMHG | DIASTOLIC BLOOD PRESSURE: 60 MMHG | HEIGHT: 69 IN | WEIGHT: 238 LBS | HEART RATE: 66 BPM

## 2023-11-14 DIAGNOSIS — E03.8 HYPOTHYROIDISM DUE TO HASHIMOTO'S THYROIDITIS: ICD-10-CM

## 2023-11-14 DIAGNOSIS — J01.00 ACUTE NON-RECURRENT MAXILLARY SINUSITIS: ICD-10-CM

## 2023-11-14 DIAGNOSIS — G47.33 OSA AND COPD OVERLAP SYNDROME (MULTI): ICD-10-CM

## 2023-11-14 DIAGNOSIS — J43.2 CENTRILOBULAR EMPHYSEMA (MULTI): ICD-10-CM

## 2023-11-14 DIAGNOSIS — J44.9 OSA AND COPD OVERLAP SYNDROME (MULTI): ICD-10-CM

## 2023-11-14 DIAGNOSIS — I11.0 HYPERTENSIVE HEART DISEASE WITH CHRONIC COMBINED SYSTOLIC AND DIASTOLIC CONGESTIVE HEART FAILURE (MULTI): Primary | ICD-10-CM

## 2023-11-14 DIAGNOSIS — E66.2 CLASS 2 OBESITY WITH ALVEOLAR HYPOVENTILATION, SERIOUS COMORBIDITY, AND BODY MASS INDEX (BMI) OF 35.0 TO 35.9 IN ADULT (MULTI): ICD-10-CM

## 2023-11-14 DIAGNOSIS — M05.79 RHEUMATOID ARTHRITIS INVOLVING MULTIPLE SITES WITH POSITIVE RHEUMATOID FACTOR (MULTI): ICD-10-CM

## 2023-11-14 DIAGNOSIS — R73.02 IGT (IMPAIRED GLUCOSE TOLERANCE): ICD-10-CM

## 2023-11-14 DIAGNOSIS — E06.3 HYPOTHYROIDISM DUE TO HASHIMOTO'S THYROIDITIS: ICD-10-CM

## 2023-11-14 DIAGNOSIS — I50.42 HYPERTENSIVE HEART DISEASE WITH CHRONIC COMBINED SYSTOLIC AND DIASTOLIC CONGESTIVE HEART FAILURE (MULTI): Primary | ICD-10-CM

## 2023-11-14 PROBLEM — I34.0 NON-RHEUMATIC MITRAL REGURGITATION: Status: RESOLVED | Noted: 2023-04-25 | Resolved: 2023-11-14

## 2023-11-14 PROBLEM — E66.812 CLASS 2 OBESITY WITH ALVEOLAR HYPOVENTILATION, SERIOUS COMORBIDITY, AND BODY MASS INDEX (BMI) OF 35.0 TO 35.9 IN ADULT: Status: ACTIVE | Noted: 2023-08-11

## 2023-11-14 PROCEDURE — 90750 HZV VACC RECOMBINANT IM: CPT | Performed by: INTERNAL MEDICINE

## 2023-11-14 PROCEDURE — 99214 OFFICE O/P EST MOD 30 MIN: CPT | Performed by: INTERNAL MEDICINE

## 2023-11-14 PROCEDURE — 1160F RVW MEDS BY RX/DR IN RCRD: CPT | Performed by: INTERNAL MEDICINE

## 2023-11-14 PROCEDURE — 90471 IMMUNIZATION ADMIN: CPT | Performed by: INTERNAL MEDICINE

## 2023-11-14 PROCEDURE — 3008F BODY MASS INDEX DOCD: CPT | Performed by: INTERNAL MEDICINE

## 2023-11-14 PROCEDURE — 1159F MED LIST DOCD IN RCRD: CPT | Performed by: INTERNAL MEDICINE

## 2023-11-14 PROCEDURE — 1036F TOBACCO NON-USER: CPT | Performed by: INTERNAL MEDICINE

## 2023-11-14 RX ORDER — CARVEDILOL 12.5 MG/1
12.5 TABLET ORAL 2 TIMES DAILY
Qty: 180 TABLET | Refills: 3 | Status: SHIPPED | OUTPATIENT
Start: 2023-11-14

## 2023-11-14 ASSESSMENT — ENCOUNTER SYMPTOMS
SINUS PAIN: 1
SINUS PRESSURE: 1
RHINORRHEA: 1

## 2023-11-14 NOTE — ASSESSMENT & PLAN NOTE
Repeat FEV1 revealed further reduction to 54% from 59% FEV1 FVC ratio was 79% also indicating mild worsening since 2018 pulmonary function test was completed on September 23, 2023 patient placed on Trelegy Ellipta with improvement in lung function continue to optimize with diet and exercise and reduce exacerbation risk with maintenance use of Trelegy on a regular basis

## 2023-11-14 NOTE — PROGRESS NOTES
"Subjective   Reason for Visit: Artemio Powell is an 68 y.o. male here for a fu visit.     Past Medical, Surgical, and Family History reviewed and updated in chart.    Reviewed all medications by prescribing practitioner or clinical pharmacist (such as prescriptions, OTCs, herbal therapies and supplements) and documented in the medical record.    HPI    Patient Care Team:  David Michaels DO as PCP - General     Review of Systems   HENT:  Positive for congestion, ear pain, rhinorrhea, sinus pressure and sinus pain.    All other systems reviewed and are negative.      Objective   Vitals:  /60 (BP Location: Left arm, Patient Position: Sitting)   Pulse 66   Ht 1.753 m (5' 9\")   Wt 108 kg (238 lb)   BMI 35.15 kg/m²       Physical Exam  Vitals and nursing note reviewed.   Constitutional:       General: He is not in acute distress.     Appearance: Normal appearance. He is well-developed. He is obese. He is not toxic-appearing.   HENT:      Head: Normocephalic and atraumatic.      Right Ear: Tympanic membrane and external ear normal.      Left Ear: Tympanic membrane and external ear normal.      Nose: Nose normal.      Mouth/Throat:      Mouth: Mucous membranes are moist.      Pharynx: Oropharynx is clear. No oropharyngeal exudate or posterior oropharyngeal erythema.      Tonsils: No tonsillar exudate. 2+ on the right. 2+ on the left.   Eyes:      Extraocular Movements: Extraocular movements intact.      Conjunctiva/sclera: Conjunctivae normal.   Cardiovascular:      Rate and Rhythm: Normal rate and regular rhythm.      Pulses: Normal pulses.      Heart sounds: Normal heart sounds. No murmur heard.  Pulmonary:      Effort: Pulmonary effort is normal.      Breath sounds: Normal breath sounds.   Abdominal:      General: Abdomen is flat. Bowel sounds are normal.      Palpations: Abdomen is soft.   Musculoskeletal:      Cervical back: Neck supple.   Feet:      Right foot:      Skin integrity: Skin integrity " normal. No ulcer, blister, skin breakdown, erythema, warmth or callus.      Toenail Condition: Right toenails are normal.      Left foot:      Skin integrity: Skin integrity normal. No ulcer, blister, skin breakdown, erythema, warmth or callus.      Toenail Condition: Left toenails are normal.   Lymphadenopathy:      Cervical: No cervical adenopathy.   Skin:     General: Skin is warm and dry.      Capillary Refill: Capillary refill takes more than 3 seconds.      Findings: No rash.   Neurological:      Mental Status: He is alert. Mental status is at baseline.      Sensory: Sensation is intact.   Psychiatric:         Mood and Affect: Mood normal.         Behavior: Behavior normal.         Thought Content: Thought content normal.         Judgment: Judgment normal.         Assessment/Plan   Problem List Items Addressed This Visit       Centrilobular emphysema (CMS/HCC)    Overview     PFTs reviewed from March 2, 2018 FEV1 59% FEV1 over FVC is 82% patient with moderately severe obstructive airways disease with severe diffusion defect with concomitant restriction related to scarring and fibrosis changes in the setting of rheumatoid arthritis and obstructive sleep apnea in the setting of morbid obesity DLCO lower at 49%  Chest x-ray noted November 2022 bilateral emphysematous changes with interstitial component and scarring suggestive of an ILD         Current Assessment & Plan     Repeat FEV1 revealed further reduction to 54% from 59% FEV1 FVC ratio was 79% also indicating mild worsening since 2018 pulmonary function test was completed on September 23, 2023 patient placed on Trelegy Ellipta with improvement in lung function continue to optimize with diet and exercise and reduce exacerbation risk with maintenance use of Trelegy on a regular basis         Hypothyroidism    Current Assessment & Plan     Clinically euthyroid continue levothyroxine 150 mcg daily         Relevant Orders    Follow Up In [facility] -  PCP - Established    Comprehensive metabolic panel    Tsh With Reflex To Free T4 If Abnormal    Lipid Panel    Hemoglobin A1C    Vitamin D 25-Hydroxy,Total (for eval of Vitamin D levels)    IGT (impaired glucose tolerance)    Current Assessment & Plan     Hemoglobin A1c 6.3 with fasting blood sugar of 91 continue to encourage diet exercise changes consider metformin versus GLP-1 agonist versus SGLT2 inhibitor therapy in the setting of heart failure discuss with cardiology         Relevant Orders    Follow Up In [facility] - PCP - Established    Comprehensive metabolic panel    Tsh With Reflex To Free T4 If Abnormal    Lipid Panel    Hemoglobin A1C    Vitamin D 25-Hydroxy,Total (for eval of Vitamin D levels)    Acute non-recurrent maxillary sinusitis    Current Assessment & Plan     Improvement with treatment of amoxicillin         Rheumatoid arthritis involving multiple sites with positive rheumatoid factor (CMS/[facility])    Current Assessment & Plan     Continues to see a rheumatologist on a regular basis continue with hydroxychloroquine 200 mg twice a day with methotrexate weekly and Renflexis infusions continue to monitor for signs of infections and risk of immunocompromise state up-to-date with vaccinations         Relevant Orders    Follow Up In [facility] - PCP - Established    Comprehensive metabolic panel    Tsh With Reflex To Free T4 If Abnormal    Lipid Panel    Hemoglobin A1C    Vitamin D 25-Hydroxy,Total (for eval of Vitamin D levels)    RAYMUNDO and COPD overlap syndrome (CMS/[facility])    Current Assessment & Plan     Continue use of nightly CPAP         Class 2 obesity with alveolar hypoventilation, serious comorbidity, and body mass index (BMI) of 35.0 to 35.9 in adult (CMS/[facility])    Current Assessment & Plan     Continue to work with diet and exercise lifestyle changes to improve BMI less than 30         Hypertensive heart disease with chronic combined systolic and diastolic congestive heart  failure (CMS/HCC) - Primary    Overview     Last echocardiogram completed July 8, 2011 status post MI stent severe hypokinetic apical anterior septal and inferoseptal segments with mild LV dysfunction with ejection fraction 45% stage I diastolic dysfunction no significant valvulopathy         Current Assessment & Plan     Well compensated at this time continue carvedilol 12.5 mg twice a day         Relevant Medications    carvedilol (Coreg) 12.5 mg tablet    Other Relevant Orders    Follow Up In [facility] - PCP - Established    Comprehensive metabolic panel    Tsh With Reflex To Free T4 If Abnormal    Lipid Panel    Hemoglobin A1C    Vitamin D 25-Hydroxy,Total (for eval of Vitamin D levels)

## 2023-11-14 NOTE — ASSESSMENT & PLAN NOTE
Continues to see a rheumatologist on a regular basis continue with hydroxychloroquine 200 mg twice a day with methotrexate weekly and Renflexis infusions continue to monitor for signs of infections and risk of immunocompromise state up-to-date with vaccinations

## 2023-11-14 NOTE — PROGRESS NOTES
"Subjective   Patient ID: Artemio Powell is a 68 y.o. male who presents for Follow-up and left ankle swelling.    HPI     Review of Systems    Objective   /60 (BP Location: Left arm, Patient Position: Sitting)   Pulse 66   Ht 1.753 m (5' 9\")   Wt 108 kg (238 lb)   BMI 35.15 kg/m²     Physical Exam    Assessment/Plan          "

## 2023-11-15 NOTE — ASSESSMENT & PLAN NOTE
Hemoglobin A1c 6.3 with fasting blood sugar of 91 continue to encourage diet exercise changes consider metformin versus GLP-1 agonist versus SGLT2 inhibitor therapy in the setting of heart failure discuss with cardiology   Secondary Intention Text (Leave Blank If You Do Not Want): The defect will heal with secondary intention.

## 2024-01-08 ENCOUNTER — TELEPHONE (OUTPATIENT)
Dept: PRIMARY CARE | Facility: CLINIC | Age: 69
End: 2024-01-08
Payer: COMMERCIAL

## 2024-01-08 DIAGNOSIS — J43.2 CENTRILOBULAR EMPHYSEMA (MULTI): ICD-10-CM

## 2024-01-08 DIAGNOSIS — E03.9 ACQUIRED HYPOTHYROIDISM: ICD-10-CM

## 2024-01-08 RX ORDER — LEVOTHYROXINE SODIUM 150 UG/1
150 TABLET ORAL DAILY
Qty: 90 TABLET | Refills: 3 | Status: SHIPPED | OUTPATIENT
Start: 2024-01-08 | End: 2024-05-14 | Stop reason: WASHOUT

## 2024-01-08 RX ORDER — FLUTICASONE FUROATE, UMECLIDINIUM BROMIDE AND VILANTEROL TRIFENATATE 200; 62.5; 25 UG/1; UG/1; UG/1
1 POWDER RESPIRATORY (INHALATION)
Qty: 90 EACH | Refills: 3 | Status: SHIPPED | OUTPATIENT
Start: 2024-01-08

## 2024-01-08 NOTE — TELEPHONE ENCOUNTER
Refill request:    Synthroid 150mg  Trelogy Inhaler    Pharmacy: [facility]

## 2024-02-26 ENCOUNTER — TELEPHONE (OUTPATIENT)
Dept: PRIMARY CARE | Facility: CLINIC | Age: 69
End: 2024-02-26
Payer: COMMERCIAL

## 2024-02-26 DIAGNOSIS — U07.1 COVID-19: Primary | ICD-10-CM

## 2024-02-26 NOTE — TELEPHONE ENCOUNTER
Chief Complaint : Covid     Symptoms:    Duration: Positive Test Saturday night     Treatments:    Patient Requesting: Jaun     Did the Patient have the covid Vaccine:    Pharmacy: [facility]    Covid Tested: Positive

## 2024-02-26 NOTE — TELEPHONE ENCOUNTER
He called to follow up on the paxlovid and told to stop his Statin for 2 weeks but the pharmacy hasn't gotten anything yet

## 2024-04-05 ENCOUNTER — LAB (OUTPATIENT)
Dept: LAB | Facility: LAB | Age: 69
End: 2024-04-05
Payer: COMMERCIAL

## 2024-04-05 DIAGNOSIS — I11.0 HYPERTENSIVE HEART DISEASE WITH CHRONIC COMBINED SYSTOLIC AND DIASTOLIC CONGESTIVE HEART FAILURE (MULTI): ICD-10-CM

## 2024-04-05 DIAGNOSIS — E06.3 HYPOTHYROIDISM DUE TO HASHIMOTO'S THYROIDITIS: ICD-10-CM

## 2024-04-05 DIAGNOSIS — D72.829 ELEVATED WHITE BLOOD CELL COUNT, UNSPECIFIED: Primary | ICD-10-CM

## 2024-04-05 DIAGNOSIS — D72.829 ELEVATED WHITE BLOOD CELL COUNT, UNSPECIFIED: ICD-10-CM

## 2024-04-05 DIAGNOSIS — R73.02 IGT (IMPAIRED GLUCOSE TOLERANCE): ICD-10-CM

## 2024-04-05 DIAGNOSIS — M05.79 RHEUMATOID ARTHRITIS INVOLVING MULTIPLE SITES WITH POSITIVE RHEUMATOID FACTOR (MULTI): ICD-10-CM

## 2024-04-05 DIAGNOSIS — I50.42 HYPERTENSIVE HEART DISEASE WITH CHRONIC COMBINED SYSTOLIC AND DIASTOLIC CONGESTIVE HEART FAILURE (MULTI): ICD-10-CM

## 2024-04-05 DIAGNOSIS — E03.8 HYPOTHYROIDISM DUE TO HASHIMOTO'S THYROIDITIS: ICD-10-CM

## 2024-04-05 LAB
25(OH)D3 SERPL-MCNC: 24 NG/ML (ref 30–100)
ALBUMIN SERPL BCP-MCNC: 3.6 G/DL (ref 3.4–5)
ALP SERPL-CCNC: 83 U/L (ref 33–136)
ALT SERPL W P-5'-P-CCNC: 18 U/L (ref 10–52)
ANION GAP SERPL CALC-SCNC: 10 MMOL/L (ref 10–20)
AST SERPL W P-5'-P-CCNC: 18 U/L (ref 9–39)
BASOPHILS # BLD AUTO: 0.05 X10*3/UL (ref 0–0.1)
BASOPHILS NFR BLD AUTO: 0.5 %
BILIRUB SERPL-MCNC: 0.5 MG/DL (ref 0–1.2)
BUN SERPL-MCNC: 16 MG/DL (ref 6–23)
CALCIUM SERPL-MCNC: 8.5 MG/DL (ref 8.6–10.3)
CHLORIDE SERPL-SCNC: 107 MMOL/L (ref 98–107)
CHOLEST SERPL-MCNC: 100 MG/DL (ref 0–199)
CHOLESTEROL/HDL RATIO: 2.5
CO2 SERPL-SCNC: 28 MMOL/L (ref 21–32)
CREAT SERPL-MCNC: 0.97 MG/DL (ref 0.5–1.3)
EGFRCR SERPLBLD CKD-EPI 2021: 83 ML/MIN/1.73M*2
EOSINOPHIL # BLD AUTO: 0.18 X10*3/UL (ref 0–0.4)
EOSINOPHIL NFR BLD AUTO: 1.9 %
ERYTHROCYTE [DISTWIDTH] IN BLOOD BY AUTOMATED COUNT: 14.2 % (ref 11.5–14.5)
EST. AVERAGE GLUCOSE BLD GHB EST-MCNC: 143 MG/DL
GLUCOSE SERPL-MCNC: 79 MG/DL (ref 74–99)
HBA1C MFR BLD: 6.6 %
HCT VFR BLD AUTO: 43.1 % (ref 41–52)
HDLC SERPL-MCNC: 40.7 MG/DL
HGB BLD-MCNC: 14 G/DL (ref 13.5–17.5)
IMM GRANULOCYTES # BLD AUTO: 0.05 X10*3/UL (ref 0–0.5)
IMM GRANULOCYTES NFR BLD AUTO: 0.5 % (ref 0–0.9)
LDLC SERPL CALC-MCNC: 44 MG/DL
LYMPHOCYTES # BLD AUTO: 2.96 X10*3/UL (ref 0.8–3)
LYMPHOCYTES NFR BLD AUTO: 30.6 %
MCH RBC QN AUTO: 29.7 PG (ref 26–34)
MCHC RBC AUTO-ENTMCNC: 32.5 G/DL (ref 32–36)
MCV RBC AUTO: 92 FL (ref 80–100)
MONOCYTES # BLD AUTO: 1 X10*3/UL (ref 0.05–0.8)
MONOCYTES NFR BLD AUTO: 10.3 %
NEUTROPHILS # BLD AUTO: 5.44 X10*3/UL (ref 1.6–5.5)
NEUTROPHILS NFR BLD AUTO: 56.2 %
NON HDL CHOLESTEROL: 59 MG/DL (ref 0–149)
NRBC BLD-RTO: 0 /100 WBCS (ref 0–0)
PLATELET # BLD AUTO: 167 X10*3/UL (ref 150–450)
POTASSIUM SERPL-SCNC: 4.3 MMOL/L (ref 3.5–5.3)
PROT SERPL-MCNC: 6.1 G/DL (ref 6.4–8.2)
RBC # BLD AUTO: 4.71 X10*6/UL (ref 4.5–5.9)
SODIUM SERPL-SCNC: 141 MMOL/L (ref 136–145)
TRIGL SERPL-MCNC: 76 MG/DL (ref 0–149)
TSH SERPL-ACNC: 2.05 MIU/L (ref 0.44–3.98)
VLDL: 15 MG/DL (ref 0–40)
WBC # BLD AUTO: 9.7 X10*3/UL (ref 4.4–11.3)

## 2024-04-05 PROCEDURE — 83036 HEMOGLOBIN GLYCOSYLATED A1C: CPT

## 2024-04-05 PROCEDURE — 85025 COMPLETE CBC W/AUTO DIFF WBC: CPT

## 2024-04-05 PROCEDURE — 80053 COMPREHEN METABOLIC PANEL: CPT

## 2024-04-05 PROCEDURE — 80061 LIPID PANEL: CPT

## 2024-04-05 PROCEDURE — 36415 COLL VENOUS BLD VENIPUNCTURE: CPT

## 2024-04-05 PROCEDURE — 84443 ASSAY THYROID STIM HORMONE: CPT

## 2024-04-05 PROCEDURE — 82306 VITAMIN D 25 HYDROXY: CPT

## 2024-05-14 ENCOUNTER — OFFICE VISIT (OUTPATIENT)
Dept: PRIMARY CARE | Facility: CLINIC | Age: 69
End: 2024-05-14
Payer: COMMERCIAL

## 2024-05-14 VITALS
HEART RATE: 68 BPM | SYSTOLIC BLOOD PRESSURE: 124 MMHG | HEIGHT: 69 IN | WEIGHT: 238 LBS | DIASTOLIC BLOOD PRESSURE: 80 MMHG | BODY MASS INDEX: 35.25 KG/M2

## 2024-05-14 DIAGNOSIS — E78.5 DYSLIPIDEMIA ASSOCIATED WITH TYPE 2 DIABETES MELLITUS (MULTI): Primary | ICD-10-CM

## 2024-05-14 DIAGNOSIS — I11.0 HYPERTENSIVE HEART DISEASE WITH CHRONIC COMBINED SYSTOLIC AND DIASTOLIC CONGESTIVE HEART FAILURE (MULTI): ICD-10-CM

## 2024-05-14 DIAGNOSIS — E11.69 DYSLIPIDEMIA ASSOCIATED WITH TYPE 2 DIABETES MELLITUS (MULTI): Primary | ICD-10-CM

## 2024-05-14 DIAGNOSIS — J44.9 OSA AND COPD OVERLAP SYNDROME (MULTI): ICD-10-CM

## 2024-05-14 DIAGNOSIS — E66.2 CLASS 2 OBESITY WITH ALVEOLAR HYPOVENTILATION, SERIOUS COMORBIDITY, AND BODY MASS INDEX (BMI) OF 35.0 TO 35.9 IN ADULT (MULTI): ICD-10-CM

## 2024-05-14 DIAGNOSIS — E03.8 HYPOTHYROIDISM DUE TO HASHIMOTO'S THYROIDITIS: ICD-10-CM

## 2024-05-14 DIAGNOSIS — K21.9 CHRONIC GERD: ICD-10-CM

## 2024-05-14 DIAGNOSIS — E11.65 TYPE 2 DIABETES MELLITUS WITH HYPERGLYCEMIA, WITHOUT LONG-TERM CURRENT USE OF INSULIN (MULTI): ICD-10-CM

## 2024-05-14 DIAGNOSIS — E55.9 VITAMIN D DEFICIENCY: ICD-10-CM

## 2024-05-14 DIAGNOSIS — R09.82 PND (POST-NASAL DRIP): ICD-10-CM

## 2024-05-14 DIAGNOSIS — I50.42 HYPERTENSIVE HEART DISEASE WITH CHRONIC COMBINED SYSTOLIC AND DIASTOLIC CONGESTIVE HEART FAILURE (MULTI): ICD-10-CM

## 2024-05-14 DIAGNOSIS — G47.33 OSA AND COPD OVERLAP SYNDROME (MULTI): ICD-10-CM

## 2024-05-14 DIAGNOSIS — E06.3 HYPOTHYROIDISM DUE TO HASHIMOTO'S THYROIDITIS: ICD-10-CM

## 2024-05-14 DIAGNOSIS — M05.79 RHEUMATOID ARTHRITIS INVOLVING MULTIPLE SITES WITH POSITIVE RHEUMATOID FACTOR (MULTI): ICD-10-CM

## 2024-05-14 DIAGNOSIS — J43.2 CENTRILOBULAR EMPHYSEMA (MULTI): ICD-10-CM

## 2024-05-14 PROBLEM — J01.00 ACUTE NON-RECURRENT MAXILLARY SINUSITIS: Status: RESOLVED | Noted: 2023-04-25 | Resolved: 2024-05-14

## 2024-05-14 PROCEDURE — 99214 OFFICE O/P EST MOD 30 MIN: CPT | Performed by: INTERNAL MEDICINE

## 2024-05-14 PROCEDURE — 3074F SYST BP LT 130 MM HG: CPT | Performed by: INTERNAL MEDICINE

## 2024-05-14 PROCEDURE — 3044F HG A1C LEVEL LT 7.0%: CPT | Performed by: INTERNAL MEDICINE

## 2024-05-14 PROCEDURE — 3079F DIAST BP 80-89 MM HG: CPT | Performed by: INTERNAL MEDICINE

## 2024-05-14 PROCEDURE — 1160F RVW MEDS BY RX/DR IN RCRD: CPT | Performed by: INTERNAL MEDICINE

## 2024-05-14 PROCEDURE — 1159F MED LIST DOCD IN RCRD: CPT | Performed by: INTERNAL MEDICINE

## 2024-05-14 PROCEDURE — 3008F BODY MASS INDEX DOCD: CPT | Performed by: INTERNAL MEDICINE

## 2024-05-14 PROCEDURE — 1036F TOBACCO NON-USER: CPT | Performed by: INTERNAL MEDICINE

## 2024-05-14 PROCEDURE — 3048F LDL-C <100 MG/DL: CPT | Performed by: INTERNAL MEDICINE

## 2024-05-14 RX ORDER — LANOLIN ALCOHOL/MO/W.PET/CERES
1000 CREAM (GRAM) TOPICAL DAILY
Qty: 90 TABLET | Refills: 3 | Status: SHIPPED | OUTPATIENT
Start: 2024-05-14 | End: 2025-05-14

## 2024-05-14 RX ORDER — LEVOTHYROXINE SODIUM 175 UG/1
175 TABLET ORAL
COMMUNITY
End: 2024-05-14 | Stop reason: SDUPTHER

## 2024-05-14 RX ORDER — ALBUTEROL SULFATE 90 UG/1
2 AEROSOL, METERED RESPIRATORY (INHALATION) EVERY 4 HOURS PRN
Qty: 18 G | Refills: 3 | Status: SHIPPED | OUTPATIENT
Start: 2024-05-14

## 2024-05-14 RX ORDER — FAMOTIDINE 40 MG/1
40 TABLET, FILM COATED ORAL NIGHTLY
Qty: 90 TABLET | Refills: 3 | Status: SHIPPED | OUTPATIENT
Start: 2024-05-14

## 2024-05-14 RX ORDER — FLUTICASONE PROPIONATE 50 MCG
2 SPRAY, SUSPENSION (ML) NASAL DAILY
Qty: 16 G | Refills: 3 | Status: SHIPPED | OUTPATIENT
Start: 2024-05-14 | End: 2025-05-14

## 2024-05-14 RX ORDER — LEVOTHYROXINE SODIUM 175 UG/1
175 TABLET ORAL
Qty: 90 TABLET | Refills: 3 | Status: SHIPPED | OUTPATIENT
Start: 2024-05-14

## 2024-05-14 ASSESSMENT — ENCOUNTER SYMPTOMS
DEPRESSION: 0
COUGH: 1
OCCASIONAL FEELINGS OF UNSTEADINESS: 0
LOSS OF SENSATION IN FEET: 0

## 2024-05-14 NOTE — ASSESSMENT & PLAN NOTE
Patient work on further diet and exercise changes will consider GLP-1 agonist therapy versus SGLT2 inhibitor therapy with next blood work to improve BMI

## 2024-05-14 NOTE — ASSESSMENT & PLAN NOTE
Follows closely with rheumatologist stable on Renflexis 100 mg injection with methotrexate 15 mg weekly and hydroxychloroquine 200 mg twice a day CBC stable

## 2024-05-14 NOTE — ASSESSMENT & PLAN NOTE
Well compensated at this time continue carvedilol 12.5 mg twice a day consider adding SGLT2 inhibitor therapy with diabetes and hemoglobin A1c of 6.6 no microalbuminuria

## 2024-05-14 NOTE — PROGRESS NOTES
"Subjective   Reason for Visit: Artemio Powell is an 69 y.o. male here for a fu visit.     Past Medical, Surgical, and Family History reviewed and updated in chart.    Reviewed all medications by prescribing practitioner or clinical pharmacist (such as prescriptions, OTCs, herbal therapies and supplements) and documented in the medical record.    Cough        Patient Care Team:  David Michaels DO as PCP - General     Review of Systems   Respiratory:  Positive for cough.    All other systems reviewed and are negative.      Objective   Vitals:  /80 (BP Location: Right arm, Patient Position: Sitting)   Pulse 68   Ht 1.753 m (5' 9\")   Wt 108 kg (238 lb)   BMI 35.15 kg/m²       Physical Exam  Vitals and nursing note reviewed.   Constitutional:       General: He is not in acute distress.     Appearance: Normal appearance. He is well-developed. He is obese. He is not toxic-appearing.   HENT:      Head: Normocephalic and atraumatic.      Right Ear: Tympanic membrane and external ear normal.      Left Ear: Tympanic membrane and external ear normal.      Nose: Nose normal.      Mouth/Throat:      Mouth: Mucous membranes are moist.      Pharynx: Oropharynx is clear. No oropharyngeal exudate or posterior oropharyngeal erythema.      Tonsils: No tonsillar exudate. 2+ on the right. 2+ on the left.   Eyes:      Extraocular Movements: Extraocular movements intact.      Conjunctiva/sclera: Conjunctivae normal.   Cardiovascular:      Rate and Rhythm: Normal rate and regular rhythm.      Pulses: Normal pulses.      Heart sounds: Normal heart sounds. No murmur heard.  Pulmonary:      Effort: Pulmonary effort is normal.      Breath sounds: Normal breath sounds.   Abdominal:      General: Abdomen is flat. Bowel sounds are normal.      Palpations: Abdomen is soft.   Musculoskeletal:      Cervical back: Neck supple.   Feet:      Right foot:      Skin integrity: Skin integrity normal. No ulcer, blister, skin breakdown, " erythema, warmth or callus.      Toenail Condition: Right toenails are normal.      Left foot:      Skin integrity: Skin integrity normal. No ulcer, blister, skin breakdown, erythema, warmth or callus.      Toenail Condition: Left toenails are normal.   Lymphadenopathy:      Cervical: No cervical adenopathy.   Skin:     General: Skin is warm and dry.      Capillary Refill: Capillary refill takes more than 3 seconds.      Findings: No rash.   Neurological:      Mental Status: He is alert. Mental status is at baseline.      Sensory: Sensation is intact.   Psychiatric:         Mood and Affect: Mood normal.         Behavior: Behavior normal.         Thought Content: Thought content normal.         Judgment: Judgment normal.         Assessment/Plan   Problem List Items Addressed This Visit       Centrilobular emphysema (Multi)    Overview     PFTs reviewed from March 2, 2018 FEV1 59% FEV1 over FVC is 82% patient with moderately severe obstructive airways disease with severe diffusion defect with concomitant restriction related to scarring and fibrosis changes in the setting of rheumatoid arthritis and obstructive sleep apnea in the setting of morbid obesity DLCO lower at 49%  Chest x-ray noted November 2022 bilateral emphysematous changes with interstitial component and scarring suggestive of an ILD         Current Assessment & Plan     Continue with Trelegy Ellipta inhaler and use of albuterol as needed no exacerbation at this time         Relevant Medications    cyanocobalamin (Vitamin B-12) 1,000 mcg tablet    famotidine (Pepcid) 40 mg tablet    albuterol 90 mcg/actuation inhaler    Other Relevant Orders    Follow Up In [facility] - PCP - Established    Comprehensive Metabolic Panel    Hemoglobin A1C    Lipid Panel    Albumin , Urine Random    Tsh With Reflex To Free T4 If Abnormal    Chronic GERD    Current Assessment & Plan     Refill famotidine at 40 mg daily patient with chronic cough may be atypical  reflux consider transition to PPI         Dyslipidemia associated with type 2 diabetes mellitus (Multi) - Primary    Current Assessment & Plan     LDL cholesterol optimal at 44 with HDL of 40 triglyceride level of 76 continue Vytorin 10/40 1 tablet daily         Relevant Orders    Follow Up In [facility] - PCP - Established    Comprehensive Metabolic Panel    Hemoglobin A1C    Lipid Panel    Albumin , Urine Random    Tsh With Reflex To Free T4 If Abnormal    Hypothyroidism    Current Assessment & Plan     Clinically euthyroid on levothyroxine 175 mcg 1 tablet daily reevaluate with next visit         Relevant Medications    levothyroxine (Synthroid, Levoxyl) 175 mcg tablet    Other Relevant Orders    Follow Up In [facility] - PCP - Established    Comprehensive Metabolic Panel    Hemoglobin A1C    Lipid Panel    Albumin , Urine Random    Tsh With Reflex To Free T4 If Abnormal    Type 2 diabetes mellitus with hyperglycemia, without long-term current use of insulin (Multi)    Current Assessment & Plan     Hemoglobin A1c increased from 6.3-6.6 no complications from diabetes at this time continue to monitor with diet control but will consider adding SGLT2 inhibitor therapy versus GLP-1 agonist therapy in the setting of BMI 35 to regimen reevaluate with next visit         Relevant Orders    Follow Up In [facility] - PCP - Established    Comprehensive Metabolic Panel    Hemoglobin A1C    Lipid Panel    Albumin , Urine Random    Tsh With Reflex To Free T4 If Abnormal    PND (post-nasal drip)    Relevant Medications    fluticasone (Flonase) 50 mcg/actuation nasal spray    Rheumatoid arthritis involving multiple sites with positive rheumatoid factor (Multi)    Current Assessment & Plan     Follows closely with rheumatologist stable on Renflexis 100 mg injection with methotrexate 15 mg weekly and hydroxychloroquine 200 mg twice a day CBC stable         Relevant Medications    cyanocobalamin  (Vitamin B-12) 1,000 mcg tablet    famotidine (Pepcid) 40 mg tablet    Other Relevant Orders    Follow Up In [facility] - PCP - Established    Comprehensive Metabolic Panel    Hemoglobin A1C    Lipid Panel    Albumin , Urine Random    Tsh With Reflex To Free T4 If Abnormal    Vitamin D deficiency    Current Assessment & Plan     Continue 2000 units of vitamin D daily         RAYMUNDO and COPD overlap syndrome (Multi)    Current Assessment & Plan     Compliant with regular use of CPAP on a nightly basis use fluticasone for postnasal drip and chronic cough reevaluate next visit         Relevant Medications    cyanocobalamin (Vitamin B-12) 1,000 mcg tablet    Other Relevant Orders    Follow Up In [facility] - PCP - Established    Comprehensive Metabolic Panel    Hemoglobin A1C    Lipid Panel    Albumin , Urine Random    Tsh With Reflex To Free T4 If Abnormal    Class 2 obesity with alveolar hypoventilation, serious comorbidity, and body mass index (BMI) of 35.0 to 35.9 in adult (Multi)    Current Assessment & Plan     Patient work on further diet and exercise changes will consider GLP-1 agonist therapy versus SGLT2 inhibitor therapy with next blood work to improve BMI         Relevant Orders    Follow Up In [facility] - PCP - Established    Comprehensive Metabolic Panel    Hemoglobin A1C    Lipid Panel    Albumin , Urine Random    Tsh With Reflex To Free T4 If Abnormal    Hypertensive heart disease with chronic combined systolic and diastolic congestive heart failure (Multi)    Overview     Last echocardiogram completed July 8, 2011 status post MI stent severe hypokinetic apical anterior septal and inferoseptal segments with mild LV dysfunction with ejection fraction 45% stage I diastolic dysfunction no significant valvulopathy         Current Assessment & Plan     Well compensated at this time continue carvedilol 12.5 mg twice a day consider adding SGLT2 inhibitor therapy with diabetes and  hemoglobin A1c of 6.6 no microalbuminuria         Relevant Orders    Follow Up In [facility] - PCP - Established    Comprehensive Metabolic Panel    Hemoglobin A1C    Lipid Panel    Albumin , Urine Random    Tsh With Reflex To Free T4 If Abnormal

## 2024-05-14 NOTE — PROGRESS NOTES
"Subjective   Patient ID: Artemio Powell is a 69 y.o. male who presents for Follow-up and Cough (For the last couple of months does have wheezing, SOB ).    HPI     Review of Systems    Objective   /80 (BP Location: Right arm, Patient Position: Sitting)   Pulse 68   Ht 1.753 m (5' 9\")   Wt 108 kg (238 lb)   BMI 35.15 kg/m²     Physical Exam    Assessment/Plan          "

## 2024-05-14 NOTE — ASSESSMENT & PLAN NOTE
LDL cholesterol optimal at 44 with HDL of 40 triglyceride level of 76 continue Vytorin 10/40 1 tablet daily

## 2024-05-14 NOTE — ASSESSMENT & PLAN NOTE
Compliant with regular use of CPAP on a nightly basis use fluticasone for postnasal drip and chronic cough reevaluate next visit

## 2024-05-14 NOTE — ASSESSMENT & PLAN NOTE
Refill famotidine at 40 mg daily patient with chronic cough may be atypical reflux consider transition to PPI

## 2024-05-14 NOTE — ASSESSMENT & PLAN NOTE
Hemoglobin A1c increased from 6.3-6.6 no complications from diabetes at this time continue to monitor with diet control but will consider adding SGLT2 inhibitor therapy versus GLP-1 agonist therapy in the setting of BMI 35 to regimen reevaluate with next visit

## 2024-07-30 ENCOUNTER — TELEPHONE (OUTPATIENT)
Dept: PRIMARY CARE | Facility: CLINIC | Age: 69
End: 2024-07-30
Payer: COMMERCIAL

## 2024-07-30 DIAGNOSIS — E55.9 VITAMIN D DEFICIENCY: ICD-10-CM

## 2024-07-30 RX ORDER — CHOLECALCIFEROL (VITAMIN D3) 50 MCG
150 TABLET ORAL DAILY
Qty: 90 TABLET | Refills: 3 | Status: SHIPPED | OUTPATIENT
Start: 2024-07-30

## 2024-07-30 NOTE — TELEPHONE ENCOUNTER
Med Refill   cholecalciferol (Vitamin D3) 50 MCG (2000 UT) tablet [[id number]]     [id number] - [place], [place] - [address]  AT [address] [facility]  [address] [facility] [place] [address]  Phone: [phone]  Fax: [phone]  MERLYN #: [id number]     LOV: 5/14/24    NOV: 11/14/24

## 2024-09-19 ENCOUNTER — TELEPHONE (OUTPATIENT)
Dept: PRIMARY CARE | Facility: CLINIC | Age: 69
End: 2024-09-19
Payer: COMMERCIAL

## 2024-09-19 DIAGNOSIS — G47.33 OSA AND COPD OVERLAP SYNDROME (MULTI): ICD-10-CM

## 2024-09-19 DIAGNOSIS — G47.33 OSA ON CPAP: ICD-10-CM

## 2024-09-19 DIAGNOSIS — M05.79 RHEUMATOID ARTHRITIS INVOLVING MULTIPLE SITES WITH POSITIVE RHEUMATOID FACTOR (MULTI): ICD-10-CM

## 2024-09-19 DIAGNOSIS — J43.2 CENTRILOBULAR EMPHYSEMA (MULTI): ICD-10-CM

## 2024-09-19 DIAGNOSIS — I25.10 CORONARY ARTERY DISEASE INVOLVING NATIVE CORONARY ARTERY OF NATIVE HEART WITHOUT ANGINA PECTORIS: ICD-10-CM

## 2024-09-19 DIAGNOSIS — J44.9 OSA AND COPD OVERLAP SYNDROME (MULTI): ICD-10-CM

## 2024-09-19 RX ORDER — EZETIMIBE AND SIMVASTATIN 10; 40 MG/1; MG/1
1 TABLET ORAL NIGHTLY
Qty: 90 TABLET | Refills: 3 | Status: SHIPPED | OUTPATIENT
Start: 2024-09-19 | End: 2025-09-19

## 2024-09-19 NOTE — TELEPHONE ENCOUNTER
Rx Refill Request Telephone Encounter    Name:  Artemio FARRELL Andre  :  [id number]  Medication Name:  Ezetimibe-Simvastatin  40 mg        Take 1 tablet by mouth once daily at bedtime  Specific Pharmacy location:  [facility]  Date of last appointment:  2024

## 2024-09-23 ENCOUNTER — APPOINTMENT (OUTPATIENT)
Dept: RADIOLOGY | Facility: HOSPITAL | Age: 69
End: 2024-09-23
Payer: COMMERCIAL

## 2024-09-23 DIAGNOSIS — M79.605 PAIN AND SWELLING OF LOWER EXTREMITY, LEFT: ICD-10-CM

## 2024-09-23 DIAGNOSIS — R09.89 SUSPECTED DVT (DEEP VEIN THROMBOSIS): ICD-10-CM

## 2024-09-23 DIAGNOSIS — M79.89 PAIN AND SWELLING OF LOWER EXTREMITY, LEFT: ICD-10-CM

## 2024-09-23 NOTE — PROGRESS NOTES
[facility]  called spoke to dr phan the paietnt left left leg swollen and painful will have STAT US for dvt check on left leg

## 2024-09-24 ENCOUNTER — HOSPITAL ENCOUNTER (OUTPATIENT)
Dept: RADIOLOGY | Facility: HOSPITAL | Age: 69
Discharge: HOME | End: 2024-09-24
Payer: COMMERCIAL

## 2024-09-24 DIAGNOSIS — M79.605 PAIN AND SWELLING OF LOWER EXTREMITY, LEFT: ICD-10-CM

## 2024-09-24 DIAGNOSIS — R09.89 SUSPECTED DVT (DEEP VEIN THROMBOSIS): ICD-10-CM

## 2024-09-24 DIAGNOSIS — M79.89 PAIN AND SWELLING OF LOWER EXTREMITY, LEFT: ICD-10-CM

## 2024-09-24 PROCEDURE — 93970 EXTREMITY STUDY: CPT

## 2024-09-24 PROCEDURE — 93970 EXTREMITY STUDY: CPT | Performed by: RADIOLOGY

## 2024-11-14 ENCOUNTER — APPOINTMENT (OUTPATIENT)
Dept: PRIMARY CARE | Facility: CLINIC | Age: 69
End: 2024-11-14
Payer: COMMERCIAL

## 2024-11-22 ENCOUNTER — APPOINTMENT (OUTPATIENT)
Dept: PRIMARY CARE | Facility: CLINIC | Age: 69
End: 2024-11-22
Payer: COMMERCIAL

## 2024-11-22 ENCOUNTER — LAB (OUTPATIENT)
Dept: LAB | Facility: LAB | Age: 69
End: 2024-11-22
Payer: COMMERCIAL

## 2024-11-22 VITALS
BODY MASS INDEX: 36.14 KG/M2 | HEART RATE: 68 BPM | WEIGHT: 244 LBS | DIASTOLIC BLOOD PRESSURE: 70 MMHG | HEIGHT: 69 IN | SYSTOLIC BLOOD PRESSURE: 120 MMHG

## 2024-11-22 DIAGNOSIS — E78.5 DYSLIPIDEMIA ASSOCIATED WITH TYPE 2 DIABETES MELLITUS (MULTI): ICD-10-CM

## 2024-11-22 DIAGNOSIS — M05.79 RHEUMATOID ARTHRITIS INVOLVING MULTIPLE SITES WITH POSITIVE RHEUMATOID FACTOR (MULTI): Primary | ICD-10-CM

## 2024-11-22 DIAGNOSIS — G47.33 OSA AND COPD OVERLAP SYNDROME (MULTI): ICD-10-CM

## 2024-11-22 DIAGNOSIS — E06.3 HYPOTHYROIDISM DUE TO HASHIMOTO'S THYROIDITIS: ICD-10-CM

## 2024-11-22 DIAGNOSIS — E66.2 CLASS 2 OBESITY WITH ALVEOLAR HYPOVENTILATION, SERIOUS COMORBIDITY, AND BODY MASS INDEX (BMI) OF 36.0 TO 36.9 IN ADULT: ICD-10-CM

## 2024-11-22 DIAGNOSIS — J44.9 OSA AND COPD OVERLAP SYNDROME (MULTI): ICD-10-CM

## 2024-11-22 DIAGNOSIS — E66.2 CLASS 2 OBESITY WITH ALVEOLAR HYPOVENTILATION, SERIOUS COMORBIDITY, AND BODY MASS INDEX (BMI) OF 35.0 TO 35.9 IN ADULT: ICD-10-CM

## 2024-11-22 DIAGNOSIS — J43.2 CENTRILOBULAR EMPHYSEMA (MULTI): ICD-10-CM

## 2024-11-22 DIAGNOSIS — E66.812 CLASS 2 OBESITY WITH ALVEOLAR HYPOVENTILATION, SERIOUS COMORBIDITY, AND BODY MASS INDEX (BMI) OF 35.0 TO 35.9 IN ADULT: ICD-10-CM

## 2024-11-22 DIAGNOSIS — I11.0 HYPERTENSIVE HEART DISEASE WITH CHRONIC COMBINED SYSTOLIC AND DIASTOLIC CONGESTIVE HEART FAILURE: ICD-10-CM

## 2024-11-22 DIAGNOSIS — E11.69 DYSLIPIDEMIA ASSOCIATED WITH TYPE 2 DIABETES MELLITUS (MULTI): ICD-10-CM

## 2024-11-22 DIAGNOSIS — G47.33 OSA ON CPAP: ICD-10-CM

## 2024-11-22 DIAGNOSIS — E66.812 CLASS 2 OBESITY WITH ALVEOLAR HYPOVENTILATION, SERIOUS COMORBIDITY, AND BODY MASS INDEX (BMI) OF 36.0 TO 36.9 IN ADULT: ICD-10-CM

## 2024-11-22 DIAGNOSIS — C44.41 BASAL CELL CARCINOMA (BCC) OF SCALP: ICD-10-CM

## 2024-11-22 DIAGNOSIS — M05.79 RHEUMATOID ARTHRITIS INVOLVING MULTIPLE SITES WITH POSITIVE RHEUMATOID FACTOR (MULTI): ICD-10-CM

## 2024-11-22 DIAGNOSIS — I50.42 HYPERTENSIVE HEART DISEASE WITH CHRONIC COMBINED SYSTOLIC AND DIASTOLIC CONGESTIVE HEART FAILURE: ICD-10-CM

## 2024-11-22 DIAGNOSIS — E11.65 TYPE 2 DIABETES MELLITUS WITH HYPERGLYCEMIA, WITHOUT LONG-TERM CURRENT USE OF INSULIN: ICD-10-CM

## 2024-11-22 LAB
ALBUMIN SERPL BCP-MCNC: 3.8 G/DL (ref 3.4–5)
ALP SERPL-CCNC: 88 U/L (ref 33–136)
ALT SERPL W P-5'-P-CCNC: 12 U/L (ref 10–52)
ANION GAP SERPL CALC-SCNC: 10 MMOL/L (ref 10–20)
AST SERPL W P-5'-P-CCNC: 21 U/L (ref 9–39)
BILIRUB SERPL-MCNC: 0.6 MG/DL (ref 0–1.2)
BUN SERPL-MCNC: 14 MG/DL (ref 6–23)
CALCIUM SERPL-MCNC: 8.6 MG/DL (ref 8.6–10.3)
CHLORIDE SERPL-SCNC: 106 MMOL/L (ref 98–107)
CHOLEST SERPL-MCNC: 93 MG/DL (ref 0–199)
CHOLESTEROL/HDL RATIO: 2.5
CO2 SERPL-SCNC: 27 MMOL/L (ref 21–32)
CREAT SERPL-MCNC: 1.05 MG/DL (ref 0.5–1.3)
EGFRCR SERPLBLD CKD-EPI 2021: 76 ML/MIN/1.73M*2
GLUCOSE SERPL-MCNC: 67 MG/DL (ref 74–99)
HDLC SERPL-MCNC: 37.8 MG/DL
LDLC SERPL CALC-MCNC: 33 MG/DL
NON HDL CHOLESTEROL: 55 MG/DL (ref 0–149)
POTASSIUM SERPL-SCNC: 3.9 MMOL/L (ref 3.5–5.3)
PROT SERPL-MCNC: 7 G/DL (ref 6.4–8.2)
SODIUM SERPL-SCNC: 139 MMOL/L (ref 136–145)
TRIGL SERPL-MCNC: 110 MG/DL (ref 0–149)
TSH SERPL-ACNC: 1.52 MIU/L (ref 0.44–3.98)
VLDL: 22 MG/DL (ref 0–40)

## 2024-11-22 PROCEDURE — 3074F SYST BP LT 130 MM HG: CPT | Performed by: INTERNAL MEDICINE

## 2024-11-22 PROCEDURE — 80061 LIPID PANEL: CPT

## 2024-11-22 PROCEDURE — 1036F TOBACCO NON-USER: CPT | Performed by: INTERNAL MEDICINE

## 2024-11-22 PROCEDURE — 1160F RVW MEDS BY RX/DR IN RCRD: CPT | Performed by: INTERNAL MEDICINE

## 2024-11-22 PROCEDURE — 3044F HG A1C LEVEL LT 7.0%: CPT | Performed by: INTERNAL MEDICINE

## 2024-11-22 PROCEDURE — 3048F LDL-C <100 MG/DL: CPT | Performed by: INTERNAL MEDICINE

## 2024-11-22 PROCEDURE — 3078F DIAST BP <80 MM HG: CPT | Performed by: INTERNAL MEDICINE

## 2024-11-22 PROCEDURE — 3008F BODY MASS INDEX DOCD: CPT | Performed by: INTERNAL MEDICINE

## 2024-11-22 PROCEDURE — 36415 COLL VENOUS BLD VENIPUNCTURE: CPT

## 2024-11-22 PROCEDURE — 80053 COMPREHEN METABOLIC PANEL: CPT

## 2024-11-22 PROCEDURE — 99214 OFFICE O/P EST MOD 30 MIN: CPT | Performed by: INTERNAL MEDICINE

## 2024-11-22 PROCEDURE — 1159F MED LIST DOCD IN RCRD: CPT | Performed by: INTERNAL MEDICINE

## 2024-11-22 PROCEDURE — 84443 ASSAY THYROID STIM HORMONE: CPT

## 2024-11-22 PROCEDURE — 83036 HEMOGLOBIN GLYCOSYLATED A1C: CPT

## 2024-11-22 RX ORDER — CARVEDILOL 12.5 MG/1
12.5 TABLET ORAL 2 TIMES DAILY
Qty: 180 TABLET | Refills: 3 | Status: SHIPPED | OUTPATIENT
Start: 2024-11-22

## 2024-11-22 RX ORDER — FAMOTIDINE 40 MG/1
40 TABLET, FILM COATED ORAL NIGHTLY
Qty: 90 TABLET | Refills: 3 | Status: SHIPPED | OUTPATIENT
Start: 2024-11-22

## 2024-11-22 NOTE — PROGRESS NOTES
"Subjective   Reason for Visit: Artemio Powell is an 69 y.o. male here for a fu visit.     Past Medical, Surgical, and Family History reviewed and updated in chart.    Reviewed all medications by prescribing practitioner or clinical pharmacist (such as prescriptions, OTCs, herbal therapies and supplements) and documented in the medical record.    HPI    Patient Care Team:  David Michaels DO as PCP - General     Review of Systems   All other systems reviewed and are negative.      Objective   Vitals:  /70   Pulse 68   Ht 1.753 m (5' 9\")   Wt 111 kg (244 lb)   BMI 36.03 kg/m²       Physical Exam  Vitals and nursing note reviewed.   Constitutional:       General: He is not in acute distress.     Appearance: Normal appearance. He is well-developed. He is obese. He is not toxic-appearing.   HENT:      Head: Normocephalic and atraumatic.      Right Ear: Tympanic membrane and external ear normal.      Left Ear: Tympanic membrane and external ear normal.      Nose: Nose normal.      Mouth/Throat:      Mouth: Mucous membranes are moist.      Pharynx: Oropharynx is clear. No oropharyngeal exudate or posterior oropharyngeal erythema.      Tonsils: No tonsillar exudate. 2+ on the right. 2+ on the left.   Eyes:      Extraocular Movements: Extraocular movements intact.      Conjunctiva/sclera: Conjunctivae normal.   Cardiovascular:      Rate and Rhythm: Normal rate and regular rhythm.      Pulses: Normal pulses.      Heart sounds: Normal heart sounds. No murmur heard.  Pulmonary:      Effort: Pulmonary effort is normal.      Breath sounds: Normal breath sounds.   Abdominal:      General: Abdomen is flat. Bowel sounds are normal.      Palpations: Abdomen is soft.   Musculoskeletal:      Cervical back: Neck supple.   Feet:      Right foot:      Skin integrity: Skin integrity normal. No ulcer, blister, skin breakdown, erythema, warmth or callus.      Toenail Condition: Right toenails are normal.      Left foot:      " Skin integrity: Skin integrity normal. No ulcer, blister, skin breakdown, erythema, warmth or callus.      Toenail Condition: Left toenails are normal.   Lymphadenopathy:      Cervical: No cervical adenopathy.   Skin:     General: Skin is warm and dry.      Capillary Refill: Capillary refill takes more than 3 seconds.      Findings: No rash.   Neurological:      Mental Status: He is alert. Mental status is at baseline.      Sensory: Sensation is intact.   Psychiatric:         Mood and Affect: Mood normal.         Behavior: Behavior normal.         Thought Content: Thought content normal.         Judgment: Judgment normal.     Lifestyle Recommendations  I recommend a whole-food plant-based diet, an eating pattern that encourages the consumption of unrefined plant foods (such as fruits, vegetables, tubers, whole grains, legumes, nuts and seeds) and discourages meats, dairy products, eggs and processed foods.     The AHA/ACC recommends that the patient consume a dietary pattern that emphasizes intake of vegetables, fruits, and whole grains; includes low-fat dairy products, poultry, fish, legumes, non-tropical vegetable oils, and nuts; and limits intake of sodium, sweets, sugar-sweetened beverages, and red meats.  Adapt this dietary pattern to appropriate calorie requirements (a 500-750 kcal/day deficit to loose weight), personal and cultural food preferences, and nutrition therapy for other medical conditions (including diabetes).  Achieve this pattern by following plans such as the [place] Mediterranean, DASH dietary pattern, or AHA diet.     Engage in 2 hours and 30 minutes per week of moderate-intensity physical activity, or 1 hour and 15 minutes (75 minutes) per week of vigorous-intensity aerobic physical activity, or an equivalent combination of moderate and vigorous-intensity aerobic physical activity. Aerobic activity should be performed in episodes of at least 10 minutes preferably spread throughout the week.      Adhering to a heart healthy diet, regular exercise habits, avoidance of tobacco products, and maintenance of a healthy weight are crucial components of their heart disease risk reduction.    Assessment & Plan  Hypertensive heart disease with chronic combined systolic and diastolic congestive heart failure  Well compensated continueCarvedilol 12.5 mg twice a day with furosemide 20 mg daily as needed no evidence of peripheral edema at this time unable to tolerate ACE ARB in past due to low blood pressure consider SGLT2 inhibitor therapy GFR stable at 76 no microalbuminuria  Orders:    Follow Up In [facility] - PCP - Established    carvedilol (Coreg) 12.5 mg tablet; Take 1 tablet (12.5 mg) by mouth 2 times a day.    Hypothyroidism due to Hashimoto's thyroiditis  Clinically euthyroid continue levothyroxine 175 mcg daily  Orders:    Follow Up In [facility] - PCP - Established    Rheumatoid arthritis involving multiple sites with positive rheumatoid factor (Multi)  Currently in good remission follows closely with rheumatologist continues on hydroxychloroquine 200 mg twice a day with methotrexate 15 mg weekly and infliximab ABDA or Renflexis injection  Orders:    Follow Up In [facility] - PCP - Established    famotidine (Pepcid) 40 mg tablet; Take 1 tablet (40 mg) by mouth once daily at bedtime.    Follow Up In [facility] - PCP - Established; Future    Centrilobular emphysema (Multi)  Improvement continue Trelegy Ellipta and as needed use of albuterol highly recommend RSV vaccination up-to-date with influenza and COVID-19 vaccination  Orders:    Follow Up In [facility] - PCP - Established    famotidine (Pepcid) 40 mg tablet; Take 1 tablet (40 mg) by mouth once daily at bedtime.    RAYMUNDO and COPD overlap syndrome (Multi)  Continues to be compliant with regular use of CPAP nightly basis doing well  Orders:    Follow Up In [facility] - PCP - Established    Type  2 diabetes mellitus with hyperglycemia, without long-term current use of insulin  Blood sugar well-controlled with diet control at this time continue to monitor with hemoglobin A1c on a 6-month basis no microalbuminuria  Orders:    Follow Up In [facility] - PCP - Established    Dyslipidemia associated with type 2 diabetes mellitus (Multi)  Continue with Vytorin 10/41 tablet nightly LDL cholesterol 33 with HDL 37 triglyceride level 110 optimal  Orders:    Follow Up In [facility] - PCP - Established    Class 2 obesity with alveolar hypoventilation, serious comorbidity, and body mass index (BMI) of 36.0 to 36.9 in adult  Continue to work diligently at regular diet and exercise therapies to improve BMI less than 30 education given       RAYMUNDO on CPAP  Compliant with rare use of CPAP on nightly basis       Basal cell carcinoma (BCC) of scalp  Referral to dermatology for evaluation of suspicious lesion on left frontal scalp high risk on immunotherapy light complected also evaluate for actinic keratosis noted on forearms and areas of scalp  Orders:    Referral to Dermatology

## 2024-11-22 NOTE — ASSESSMENT & PLAN NOTE
Well compensated continueCarvedilol 12.5 mg twice a day with furosemide 20 mg daily as needed no evidence of peripheral edema at this time unable to tolerate ACE ARB in past due to low blood pressure consider SGLT2 inhibitor therapy GFR stable at 76 no microalbuminuria  Orders:    Follow Up In [facility] - PCP - Established    carvedilol (Coreg) 12.5 mg tablet; Take 1 tablet (12.5 mg) by mouth 2 times a day.

## 2024-11-22 NOTE — ASSESSMENT & PLAN NOTE
Improvement continue Trelegy Ellipta and as needed use of albuterol highly recommend RSV vaccination up-to-date with influenza and COVID-19 vaccination  Orders:    Follow Up In [facility] - PCP - Established    famotidine (Pepcid) 40 mg tablet; Take 1 tablet (40 mg) by mouth once daily at bedtime.

## 2024-11-22 NOTE — ASSESSMENT & PLAN NOTE
Continue to work diligently at regular diet and exercise therapies to improve BMI less than 30 education given

## 2024-11-22 NOTE — ASSESSMENT & PLAN NOTE
Currently in good remission follows closely with rheumatologist continues on hydroxychloroquine 200 mg twice a day with methotrexate 15 mg weekly and infliximab ABDA or Renflexis injection  Orders:    Follow Up In [facility] - PCP - Established    famotidine (Pepcid) 40 mg tablet; Take 1 tablet (40 mg) by mouth once daily at bedtime.    Follow Up In [facility] - PCP - Established; Future

## 2024-11-22 NOTE — PROGRESS NOTES
"Subjective   Patient ID: Artemio Powell is a 69 y.o. male who presents for Follow-up.    HPI     Review of Systems    Objective   /70   Pulse 68   Ht 1.753 m (5' 9\")   Wt 111 kg (244 lb)   BMI 36.03 kg/m²     Physical Exam    Assessment/Plan          "

## 2024-11-22 NOTE — ASSESSMENT & PLAN NOTE
Blood sugar well-controlled with diet control at this time continue to monitor with hemoglobin A1c on a 6-month basis no microalbuminuria  Orders:    Follow Up In [facility] - PCP - Established

## 2024-11-22 NOTE — ASSESSMENT & PLAN NOTE
Continues to be compliant with regular use of CPAP nightly basis doing well  Orders:    Follow Up In [facility] - PCP - Established

## 2024-11-22 NOTE — ASSESSMENT & PLAN NOTE
Continue with Vytorin 10/41 tablet nightly LDL cholesterol 33 with HDL 37 triglyceride level 110 optimal  Orders:    Follow Up In [facility] - PCP - Established

## 2024-11-23 LAB
EST. AVERAGE GLUCOSE BLD GHB EST-MCNC: 120 MG/DL
HBA1C MFR BLD: 5.8 %

## 2025-02-21 ENCOUNTER — OFFICE VISIT (OUTPATIENT)
Dept: URGENT CARE | Age: 70
End: 2025-02-21
Payer: COMMERCIAL

## 2025-02-21 VITALS
TEMPERATURE: 98.3 F | OXYGEN SATURATION: 94 % | SYSTOLIC BLOOD PRESSURE: 125 MMHG | DIASTOLIC BLOOD PRESSURE: 70 MMHG | HEART RATE: 74 BPM | RESPIRATION RATE: 16 BRPM

## 2025-02-21 DIAGNOSIS — R05.1 ACUTE COUGH: ICD-10-CM

## 2025-02-21 DIAGNOSIS — J01.90 ACUTE SINUSITIS, RECURRENCE NOT SPECIFIED, UNSPECIFIED LOCATION: Primary | ICD-10-CM

## 2025-02-21 RX ORDER — BENZONATATE 200 MG/1
200 CAPSULE ORAL NIGHTLY PRN
Qty: 30 CAPSULE | Refills: 0 | Status: SHIPPED | OUTPATIENT
Start: 2025-02-21 | End: 2025-02-28

## 2025-02-21 RX ORDER — AMOXICILLIN AND CLAVULANATE POTASSIUM 875; 125 MG/1; MG/1
1 TABLET, FILM COATED ORAL 2 TIMES DAILY
Qty: 20 TABLET | Refills: 0 | Status: SHIPPED | OUTPATIENT
Start: 2025-02-21 | End: 2025-03-03

## 2025-02-21 ASSESSMENT — ENCOUNTER SYMPTOMS
FATIGUE: 1
ABDOMINAL PAIN: 0
FEVER: 0
HEADACHES: 1
LOSS OF CONSCIOUSNESS: 0
DIARRHEA: 0
WHEEZING: 1
RHINORRHEA: 1
COUGH: 1
SHORTNESS OF BREATH: 0
NAUSEA: 0
SINUS COMPLAINT: 1
MYALGIAS: 0
VOMITING: 0
SORE THROAT: 0

## 2025-02-21 NOTE — PROGRESS NOTES
Subjective   Patient ID: Artemio Moyerjtkofsky is a 70 y.o. male. They present today with a chief complaint of cough, congestion, sinus pressure x10 days.    History of Present Illness  Pt has COPD and is on Trelegy and albuterol inhaler      History provided by:  Patient   used: No    Sinus Problem  Location:  Maxillary sinus pressure  Severity:  Moderate  Onset quality:  Gradual  Duration:  10 days  Timing:  Constant  Progression:  Worsening  Chronicity:  New  Relieved by:  Mucinex, Nyquil, Tylenol  Worsened by:  Nothing  Associated symptoms: congestion, cough (thick brown sputum), fatigue, headaches, rhinorrhea and wheezing    Associated symptoms: no abdominal pain, no chest pain, no diarrhea, no ear pain, no fever, no loss of consciousness, no myalgias, no nausea, no rash, no shortness of breath, no sore throat and no vomiting        Past Medical History  Allergies as of 02/21/2025    (No Known Allergies)       (Not in a hospital admission)       Past Medical History:   Diagnosis Date    Chronic obstructive pulmonary disease with (acute) exacerbation (Multi) 04/21/2020    Acute exacerbation of chronic obstructive pulmonary disease (COPD)    Gastro-esophageal reflux disease without esophagitis 02/16/2022    Gastroesophageal reflux disease without esophagitis    Old myocardial infarction     History of myocardial infarction    Personal history of diseases of the blood and blood-forming organs and certain disorders involving the immune mechanism     History of anemia    Personal history of other diseases of the circulatory system 12/24/2019    History of pulmonary hypertension    Personal history of other diseases of the digestive system     History of esophageal reflux    Personal history of other diseases of the musculoskeletal system and connective tissue     History of rheumatoid arthritis    Personal history of other diseases of the respiratory system 06/07/2018    History of acute pharyngitis     Personal history of other diseases of the respiratory system 06/07/2018    History of acute bronchitis    Personal history of other diseases of the respiratory system 06/07/2018    History of sore throat    Personal history of other endocrine, nutritional and metabolic disease 07/23/2020    History of hyperlipidemia    Personal history of peptic ulcer disease     History of peptic ulcer    Personal history of pneumonia (recurrent) 06/29/2022    History of pneumonia    Pulmonary hypertension, unspecified (Multi) 05/20/2021    Pulmonary hypertension, mild    Shortness of breath 06/29/2022    SOB (shortness of breath) on exertion       Past Surgical History:   Procedure Laterality Date    CORONARY ANGIOPLASTY  03/12/2019    PTCA    ELBOW SURGERY  02/16/2017    Elbow Surgery    FOOT SURGERY  02/16/2017    Foot Surgery Right    HAND SURGERY  02/16/2017    Hand Surgery                                                                                                                                                          HERNIA REPAIR  02/16/2017    Hernia Repair    OTHER SURGICAL HISTORY  02/16/2017    Foot Surgery Left        reports that he has quit smoking. His smoking use included cigarettes. He has never used smokeless tobacco. He reports that he does not currently use alcohol. He reports that he does not use drugs.    Review of Systems  Review of Systems   Constitutional:  Positive for fatigue. Negative for fever.   HENT:  Positive for congestion and rhinorrhea. Negative for ear pain and sore throat.    Respiratory:  Positive for cough (thick brown sputum) and wheezing. Negative for shortness of breath.    Cardiovascular:  Negative for chest pain.   Gastrointestinal:  Negative for abdominal pain, diarrhea, nausea and vomiting.   Musculoskeletal:  Negative for myalgias.   Skin:  Negative for rash.   Neurological:  Positive for headaches. Negative for loss of consciousness.          Objective    Vitals:    02/21/25  1117   BP: 125/70   Pulse: 74   Resp: 16   Temp: 36.8 °C (98.3 °F)   SpO2: 94%     No LMP for male patient.    Physical Exam  Vitals and nursing note reviewed.   Constitutional:       Appearance: Normal appearance.   HENT:      Head: Normocephalic and atraumatic.      Right Ear: Hearing, tympanic membrane, ear canal and external ear normal.      Left Ear: Hearing, tympanic membrane, ear canal and external ear normal.      Nose: Mucosal edema and congestion present. No nasal deformity, septal deviation, signs of injury, laceration, nasal tenderness or rhinorrhea.      Right Sinus: No maxillary sinus tenderness or frontal sinus tenderness.      Left Sinus: No maxillary sinus tenderness or frontal sinus tenderness.      Mouth/Throat:      Lips: Pink.      Mouth: Mucous membranes are moist.      Pharynx: Uvula midline. Postnasal drip present.      Tonsils: No tonsillar exudate or tonsillar abscesses. 0 on the right. 0 on the left.   Cardiovascular:      Rate and Rhythm: Normal rate and regular rhythm.      Heart sounds: Normal heart sounds.   Pulmonary:      Effort: Pulmonary effort is normal.      Breath sounds: Normal breath sounds and air entry.   Musculoskeletal:      Cervical back: Normal range of motion and neck supple.   Lymphadenopathy:      Cervical: No cervical adenopathy.   Neurological:      Mental Status: He is alert.   Psychiatric:         Mood and Affect: Mood normal.         Behavior: Behavior normal.         Procedures    Point of Care Test & Imaging Results from this visit  No results found for this visit on 02/21/25.   No results found.    Diagnostic study results (if any) were reviewed by LAURIE Colunga.    Assessment/Plan   Allergies, medications, history, and pertinent labs/EKGs/Imaging reviewed by LAURIE Colunga.     Medical Decision Making  Take the antibiotic with food.  Eat yogurt or take probiotic once a day.  Symptoms should improve in 2 to 3 days.   Take benzonatate as needed  for cough.  Wash your hands often, especially after coughing or touching your nose or mouth.  Use disposable tissues instead of handkerchiefs.  Increase fluid intake and rest as needed.  Take Tylenol and/or ibuprofen as needed for aches and pain and/or fever.  Return or follow-up with primary care provider if symptoms did not improve.  Call 911 or go to the nearest emergency room if symptoms became severe such as fever of 102.5 degrees Fahrenheit or 39.2 degrees Celsius, severe pain, shortness of breath, chest tightness.   Patient verbalized understanding of the instructions and left in stable condition.      Orders and Diagnoses  Diagnoses and all orders for this visit:  Acute sinusitis, recurrence not specified, unspecified location  -     amoxicillin-pot clavulanate (Augmentin) 875-125 mg tablet; Take 1 tablet by mouth 2 times a day for 10 days.  Acute cough  -     benzonatate (Tessalon) 200 mg capsule; Take 1 capsule (200 mg) by mouth as needed at bedtime for cough for up to 7 days. Do not crush or chew.      Medical Admin Record      Patient disposition: Home    Electronically signed by LAURIE Colunga  11:35 AM

## 2025-04-24 ENCOUNTER — TELEPHONE (OUTPATIENT)
Dept: PRIMARY CARE | Facility: CLINIC | Age: 70
End: 2025-04-24
Payer: COMMERCIAL

## 2025-04-24 DIAGNOSIS — E06.3 HYPOTHYROIDISM DUE TO HASHIMOTO'S THYROIDITIS: ICD-10-CM

## 2025-04-24 RX ORDER — LEVOTHYROXINE SODIUM 175 UG/1
175 TABLET ORAL
Qty: 90 TABLET | Refills: 3 | Status: SHIPPED | OUTPATIENT
Start: 2025-04-24

## 2025-04-24 NOTE — TELEPHONE ENCOUNTER
Rx Refill Request Telephone Encounter    Name:  Artemio FARRELL Sangeetaky  :  [id number]  Medication Name:  Levothyroxine  175 mcg        Take 1 tablet by mouth once daily in the morning  Specific Pharmacy location:  [facility]  Date of last appointment:  24

## 2025-04-24 NOTE — TELEPHONE ENCOUNTER
Rx Refill Request Telephone Encounter     Name:  Artemio FARRELL Sangeetaky  :  [id number]  Medication Name:  Levothyroxine  175 mcg           Take 1 tablet by mouth once daily in the morning  Specific Pharmacy location:  [facility]  Date of last appointment:  24

## 2025-05-22 ENCOUNTER — APPOINTMENT (OUTPATIENT)
Dept: PRIMARY CARE | Facility: CLINIC | Age: 70
End: 2025-05-22
Payer: COMMERCIAL

## 2025-05-22 VITALS
HEART RATE: 55 BPM | DIASTOLIC BLOOD PRESSURE: 82 MMHG | HEIGHT: 69 IN | SYSTOLIC BLOOD PRESSURE: 120 MMHG | WEIGHT: 243 LBS | BODY MASS INDEX: 35.99 KG/M2

## 2025-05-22 DIAGNOSIS — K42.9 UMBILICAL HERNIA WITHOUT OBSTRUCTION AND WITHOUT GANGRENE: ICD-10-CM

## 2025-05-22 DIAGNOSIS — G47.33 OSA ON CPAP: ICD-10-CM

## 2025-05-22 DIAGNOSIS — G47.33 OSA AND COPD OVERLAP SYNDROME (MULTI): ICD-10-CM

## 2025-05-22 DIAGNOSIS — N40.0 BPH WITHOUT URINARY OBSTRUCTION: ICD-10-CM

## 2025-05-22 DIAGNOSIS — J44.9 OSA AND COPD OVERLAP SYNDROME (MULTI): ICD-10-CM

## 2025-05-22 DIAGNOSIS — Z15.89 ENCOUNTER FOR SCREENING FOR ABDOMINAL AORTIC ANEURYSM (AAA) IN PATIENT WITH GENETIC PREDISPOSITION TO AAA: ICD-10-CM

## 2025-05-22 DIAGNOSIS — E06.3 HYPOTHYROIDISM DUE TO HASHIMOTO THYROIDITIS: ICD-10-CM

## 2025-05-22 DIAGNOSIS — Z12.11 COLON CANCER SCREENING: ICD-10-CM

## 2025-05-22 DIAGNOSIS — E11.69 DYSLIPIDEMIA ASSOCIATED WITH TYPE 2 DIABETES MELLITUS: ICD-10-CM

## 2025-05-22 DIAGNOSIS — E78.5 DYSLIPIDEMIA ASSOCIATED WITH TYPE 2 DIABETES MELLITUS: ICD-10-CM

## 2025-05-22 DIAGNOSIS — E53.8 VITAMIN B12 DEFICIENCY: ICD-10-CM

## 2025-05-22 DIAGNOSIS — E55.9 VITAMIN D DEFICIENCY: ICD-10-CM

## 2025-05-22 DIAGNOSIS — I50.42 HYPERTENSIVE HEART DISEASE WITH CHRONIC COMBINED SYSTOLIC AND DIASTOLIC CONGESTIVE HEART FAILURE: Primary | ICD-10-CM

## 2025-05-22 DIAGNOSIS — J43.2 CENTRILOBULAR EMPHYSEMA (MULTI): ICD-10-CM

## 2025-05-22 DIAGNOSIS — E66.812 CLASS 2 OBESITY WITH ALVEOLAR HYPOVENTILATION, SERIOUS COMORBIDITY, AND BODY MASS INDEX (BMI) OF 35.0 TO 35.9 IN ADULT: ICD-10-CM

## 2025-05-22 DIAGNOSIS — I11.0 HYPERTENSIVE HEART DISEASE WITH CHRONIC COMBINED SYSTOLIC AND DIASTOLIC CONGESTIVE HEART FAILURE: Primary | ICD-10-CM

## 2025-05-22 DIAGNOSIS — E66.2 CLASS 2 OBESITY WITH ALVEOLAR HYPOVENTILATION, SERIOUS COMORBIDITY, AND BODY MASS INDEX (BMI) OF 35.0 TO 35.9 IN ADULT: ICD-10-CM

## 2025-05-22 DIAGNOSIS — M05.79 RHEUMATOID ARTHRITIS INVOLVING MULTIPLE SITES WITH POSITIVE RHEUMATOID FACTOR (MULTI): ICD-10-CM

## 2025-05-22 DIAGNOSIS — Z13.6 ENCOUNTER FOR SCREENING FOR ABDOMINAL AORTIC ANEURYSM (AAA) IN PATIENT WITH GENETIC PREDISPOSITION TO AAA: ICD-10-CM

## 2025-05-22 PROCEDURE — 3008F BODY MASS INDEX DOCD: CPT | Performed by: INTERNAL MEDICINE

## 2025-05-22 PROCEDURE — 3074F SYST BP LT 130 MM HG: CPT | Performed by: INTERNAL MEDICINE

## 2025-05-22 PROCEDURE — 90471 IMMUNIZATION ADMIN: CPT | Performed by: INTERNAL MEDICINE

## 2025-05-22 PROCEDURE — 99214 OFFICE O/P EST MOD 30 MIN: CPT | Performed by: INTERNAL MEDICINE

## 2025-05-22 PROCEDURE — 3079F DIAST BP 80-89 MM HG: CPT | Performed by: INTERNAL MEDICINE

## 2025-05-22 PROCEDURE — 1159F MED LIST DOCD IN RCRD: CPT | Performed by: INTERNAL MEDICINE

## 2025-05-22 PROCEDURE — 90715 TDAP VACCINE 7 YRS/> IM: CPT | Performed by: INTERNAL MEDICINE

## 2025-05-22 PROCEDURE — 1160F RVW MEDS BY RX/DR IN RCRD: CPT | Performed by: INTERNAL MEDICINE

## 2025-05-22 PROCEDURE — 1036F TOBACCO NON-USER: CPT | Performed by: INTERNAL MEDICINE

## 2025-05-22 PROCEDURE — 1123F ACP DISCUSS/DSCN MKR DOCD: CPT | Performed by: INTERNAL MEDICINE

## 2025-05-22 PROCEDURE — 1158F ADVNC CARE PLAN TLK DOCD: CPT | Performed by: INTERNAL MEDICINE

## 2025-05-22 RX ORDER — CHOLECALCIFEROL (VITAMIN D3) 50 MCG
150 TABLET ORAL DAILY
Qty: 90 TABLET | Refills: 3 | Status: SHIPPED | OUTPATIENT
Start: 2025-05-22 | End: 2025-05-22 | Stop reason: SDUPTHER

## 2025-05-22 RX ORDER — FLUTICASONE FUROATE, UMECLIDINIUM BROMIDE AND VILANTEROL TRIFENATATE 200; 62.5; 25 UG/1; UG/1; UG/1
1 POWDER RESPIRATORY (INHALATION)
Qty: 90 EACH | Refills: 3 | Status: SHIPPED | OUTPATIENT
Start: 2025-05-22

## 2025-05-22 RX ORDER — CHOLECALCIFEROL (VITAMIN D3) 50 MCG
150 TABLET ORAL DAILY
Qty: 90 TABLET | Refills: 3 | Status: SHIPPED | OUTPATIENT
Start: 2025-05-22

## 2025-05-22 RX ORDER — FUROSEMIDE 20 MG/1
20 TABLET ORAL DAILY
Qty: 90 TABLET | Refills: 3 | Status: SHIPPED | OUTPATIENT
Start: 2025-05-22

## 2025-05-22 ASSESSMENT — ENCOUNTER SYMPTOMS
DEPRESSION: 0
LOSS OF SENSATION IN FEET: 0
OCCASIONAL FEELINGS OF UNSTEADINESS: 0

## 2025-05-22 ASSESSMENT — COLUMBIA-SUICIDE SEVERITY RATING SCALE - C-SSRS
6. HAVE YOU EVER DONE ANYTHING, STARTED TO DO ANYTHING, OR PREPARED TO DO ANYTHING TO END YOUR LIFE?: NO
2. HAVE YOU ACTUALLY HAD ANY THOUGHTS OF KILLING YOURSELF?: NO
6. HAVE YOU EVER DONE ANYTHING, STARTED TO DO ANYTHING, OR PREPARED TO DO ANYTHING TO END YOUR LIFE?: NO
2. HAVE YOU ACTUALLY HAD ANY THOUGHTS OF KILLING YOURSELF?: NO
1. IN THE PAST MONTH, HAVE YOU WISHED YOU WERE DEAD OR WISHED YOU COULD GO TO SLEEP AND NOT WAKE UP?: NO
1. IN THE PAST MONTH, HAVE YOU WISHED YOU WERE DEAD OR WISHED YOU COULD GO TO SLEEP AND NOT WAKE UP?: NO

## 2025-05-22 ASSESSMENT — ANXIETY QUESTIONNAIRES
2. NOT BEING ABLE TO STOP OR CONTROL WORRYING: NOT AT ALL
5. BEING SO RESTLESS THAT IT IS HARD TO SIT STILL: NOT AT ALL
7. FEELING AFRAID AS IF SOMETHING AWFUL MIGHT HAPPEN: NOT AT ALL
4. TROUBLE RELAXING: NOT AT ALL
6. BECOMING EASILY ANNOYED OR IRRITABLE: NOT AT ALL
GAD7 TOTAL SCORE: 0
3. WORRYING TOO MUCH ABOUT DIFFERENT THINGS: NOT AT ALL
1. FEELING NERVOUS, ANXIOUS, OR ON EDGE: NOT AT ALL
IF YOU CHECKED OFF ANY PROBLEMS ON THIS QUESTIONNAIRE, HOW DIFFICULT HAVE THESE PROBLEMS MADE IT FOR YOU TO DO YOUR WORK, TAKE CARE OF THINGS AT HOME, OR GET ALONG WITH OTHER PEOPLE: NOT DIFFICULT AT ALL

## 2025-05-22 ASSESSMENT — PATIENT HEALTH QUESTIONNAIRE - PHQ9
1. LITTLE INTEREST OR PLEASURE IN DOING THINGS: NOT AT ALL
SUM OF ALL RESPONSES TO PHQ9 QUESTIONS 1 AND 2: 0
1. LITTLE INTEREST OR PLEASURE IN DOING THINGS: NOT AT ALL
2. FEELING DOWN, DEPRESSED OR HOPELESS: NOT AT ALL
SUM OF ALL RESPONSES TO PHQ9 QUESTIONS 1 AND 2: 0
2. FEELING DOWN, DEPRESSED OR HOPELESS: NOT AT ALL

## 2025-06-05 ENCOUNTER — APPOINTMENT (OUTPATIENT)
Dept: PHARMACY | Facility: HOSPITAL | Age: 70
End: 2025-06-05
Payer: COMMERCIAL

## 2025-06-05 DIAGNOSIS — J43.2 CENTRILOBULAR EMPHYSEMA (MULTI): ICD-10-CM

## 2025-06-05 NOTE — PROGRESS NOTES
"Pharmacy Clinic Note    Artemio Powell is a 70 y.o. male was referred to Clinical Pharmacy Team for COPD [place].     Referring Provider: David Michaels DO    Subjective   Allergies[1]    [facility] [place] - [facility] AT Portal to Registered [facility]  [address] [facility]  [place] [address]  Phone: [phone] Fax: [phone]    [id number] [place], [place] - [address] AT SEC OF [facility]  [address]  [facility] [address]  Phone: [phone] Fax: [phone]      Objective     There were no vitals taken for this visit.     LAB  Lab Results   Component Value Date    BILITOT 0.6 11/22/2024    CALCIUM 8.6 11/22/2024    CO2 27 11/22/2024     11/22/2024    CREATININE 1.05 11/22/2024    GLUCOSE 67 (L) 11/22/2024    ALKPHOS 88 11/22/2024    K 3.9 11/22/2024    PROT 7.0 11/22/2024     11/22/2024    AST 21 11/22/2024    ALT 12 11/22/2024    BUN 14 11/22/2024    ANIONGAP 10 11/22/2024    ALBUMIN 3.8 11/22/2024    GFRMALE 84 02/14/2023     Lab Results   Component Value Date    TRIG 110 11/22/2024    CHOL 93 11/22/2024    LDLCALC 33 11/22/2024    HDL 37.8 11/22/2024     Lab Results   Component Value Date    HGBA1C 5.8 (H) 11/22/2024     Estimated body mass index is 35.88 kg/m² as calculated from the following:    Height as of 5/22/25: 1.753 m (5' 9\").    Weight as of 5/22/25: 110 kg (243 lb).      Medications Ordered Prior to Encounter[2]     DRUG INTERACTIONS  - No significant drug-drug interactions exist that require change in therapy  _______________________________________________________________________  PATIENT EDUCATION/GOALS    1.  Patient referred to pharmacy team as part of COPD [facility] of Excellence program. Patient state the the are currently well controlled on their regimen. They state that they can afford the cost of the medications at this time. Patient informed that they can reach out to pharmacy team in " the future if issues arise.  _______________________________________________________________________    Assessment/Plan   Problem List Items Addressed This Visit       Centrilobular emphysema (Multi)        Continue all meds under the continuation of care with the referring provider and clinical pharmacy team.    Clinical Pharmacist follow-up: n/a    Clayton Raymundo, PharmD     Verbal consent to manage patient's drug therapy was obtained from [the patient and/or an individual authorized to act on behalf of a patient]. They were informed they may decline to participate or withdraw from participation in pharmacy services at any time.           [1] No Known Allergies  [2]   Current Outpatient Medications on File Prior to Visit   Medication Sig Dispense Refill    albuterol 90 mcg/actuation inhaler Inhale 2 puffs every 4 hours if needed for shortness of breath or wheezing. 18 g 3    aspirin 81 mg EC tablet Take 1 tablet (81 mg) by mouth once daily.      carvedilol (Coreg) 12.5 mg tablet Take 1 tablet (12.5 mg) by mouth 2 times a day. 180 tablet 3    cholecalciferol (Vitamin D3) 50 mcg (2,000 units) tablet Take 3 tablets (150 mcg) by mouth once daily. 90 tablet 3    ezetimibe-simvastatin (Vytorin) 10-40 mg tablet Take 1 tablet by mouth once daily at bedtime. 90 tablet 3    famotidine (Pepcid) 40 mg tablet Take 1 tablet (40 mg) by mouth once daily at bedtime. 90 tablet 3    fluticasone (Flonase) 50 mcg/actuation nasal spray Administer 2 sprays into each nostril once daily. 16 g 3    fluticasone-umeclidin-vilanter (Trelegy Ellipta) 200-62.5-25 mcg blister with device Inhale 1 puff once daily. 90 each 3    folic acid (Folvite) 1 mg tablet Take 1 tablet (1 mg) by mouth once daily.      furosemide (Lasix) 20 mg tablet Take 1 tablet (20 mg) by mouth once daily. 90 tablet 3    ibandronate (Boniva) 150 mg tablet Take by mouth.      inFLIXimab-abda (Renflexis) 100 mg injection Infuse into a venous catheter.      levothyroxine  (Synthroid, Levoxyl) 175 mcg tablet Take 1 tablet (175 mcg) by mouth once daily in the morning. Take before meals. 90 tablet 3    methotrexate (Trexall) 2.5 mg tablet Take 6 tablets (15 mg total) by mouth 1 (one) time per week.      PlaqueniL 200 mg tablet Take by mouth twice a day.       No current facility-administered medications on file prior to visit.

## 2025-06-07 LAB — NONINV COLON CA DNA+OCC BLD SCRN STL QL: NEGATIVE

## 2025-06-19 ENCOUNTER — HOSPITAL ENCOUNTER (OUTPATIENT)
Dept: VASCULAR MEDICINE | Facility: HOSPITAL | Age: 70
Discharge: HOME | End: 2025-06-19
Payer: COMMERCIAL

## 2025-06-19 DIAGNOSIS — Z13.6 ENCOUNTER FOR SCREENING FOR ABDOMINAL AORTIC ANEURYSM (AAA) IN PATIENT WITH GENETIC PREDISPOSITION TO AAA: ICD-10-CM

## 2025-06-19 DIAGNOSIS — Z15.89 ENCOUNTER FOR SCREENING FOR ABDOMINAL AORTIC ANEURYSM (AAA) IN PATIENT WITH GENETIC PREDISPOSITION TO AAA: ICD-10-CM

## 2025-06-19 PROCEDURE — 76706 US ABDL AORTA SCREEN AAA: CPT

## 2025-06-19 PROCEDURE — 76706 US ABDL AORTA SCREEN AAA: CPT | Performed by: SURGERY

## 2025-07-13 ENCOUNTER — ANCILLARY PROCEDURE (OUTPATIENT)
Dept: URGENT CARE | Age: 70
End: 2025-07-13
Payer: COMMERCIAL

## 2025-07-13 ENCOUNTER — OFFICE VISIT (OUTPATIENT)
Dept: URGENT CARE | Age: 70
End: 2025-07-13
Payer: COMMERCIAL

## 2025-07-13 VITALS
SYSTOLIC BLOOD PRESSURE: 146 MMHG | TEMPERATURE: 98.3 F | RESPIRATION RATE: 17 BRPM | OXYGEN SATURATION: 96 % | HEART RATE: 63 BPM | DIASTOLIC BLOOD PRESSURE: 96 MMHG

## 2025-07-13 DIAGNOSIS — J06.9 UPPER RESPIRATORY INFECTION WITH COUGH AND CONGESTION: ICD-10-CM

## 2025-07-13 DIAGNOSIS — J18.9 COMMUNITY ACQUIRED PNEUMONIA OF RIGHT LUNG, UNSPECIFIED PART OF LUNG: Primary | ICD-10-CM

## 2025-07-13 DIAGNOSIS — Z20.822 SUSPECTED COVID-19 VIRUS INFECTION: ICD-10-CM

## 2025-07-13 LAB
POC CORONAVIRUS SARS-COV-2 PCR: NEGATIVE
POC HUMAN RHINOVIRUS PCR: NEGATIVE
POC INFLUENZA A VIRUS PCR: NEGATIVE
POC INFLUENZA B VIRUS PCR: NEGATIVE
POC RESPIRATORY SYNCYTIAL VIRUS PCR: NEGATIVE

## 2025-07-13 PROCEDURE — 1160F RVW MEDS BY RX/DR IN RCRD: CPT

## 2025-07-13 PROCEDURE — 3077F SYST BP >= 140 MM HG: CPT

## 2025-07-13 PROCEDURE — 1036F TOBACCO NON-USER: CPT

## 2025-07-13 PROCEDURE — 87631 RESP VIRUS 3-5 TARGETS: CPT

## 2025-07-13 PROCEDURE — 1159F MED LIST DOCD IN RCRD: CPT

## 2025-07-13 PROCEDURE — 99213 OFFICE O/P EST LOW 20 MIN: CPT

## 2025-07-13 PROCEDURE — 71046 X-RAY EXAM CHEST 2 VIEWS: CPT

## 2025-07-13 PROCEDURE — 3080F DIAST BP >= 90 MM HG: CPT

## 2025-07-13 RX ORDER — DOXYCYCLINE 100 MG/1
100 CAPSULE ORAL 2 TIMES DAILY
Qty: 20 CAPSULE | Refills: 0 | Status: SHIPPED | OUTPATIENT
Start: 2025-07-13 | End: 2025-07-23

## 2025-07-13 NOTE — PROGRESS NOTES
Subjective   Patient ID: Artemio Palenciatlissa is a 70 y.o. male. They present today with a chief complaint of Cough (Complaint of productive cough and head pressure for 3 days. ).    History of Present Illness  HPI a 70-year-old male arrives to clinic with chief complaint of cough and head pressure.  The patient reports having the symptoms over the last 3 days.  He reports that he frequently gets infusions to help with his rheumatoid arthritis and that he often gets sick afterwards.  He is here for evaluation.    Past Medical History  Allergies as of 07/13/2025    (No Known Allergies)       Prescriptions Prior to Admission[1]     Medical History[2]    Surgical History[3]     reports that he has quit smoking. His smoking use included cigarettes. He has never used smokeless tobacco. He reports that he does not currently use alcohol. He reports that he does not use drugs.    Review of Systems  Review of Systems  Cough, congestion, sinus pressure  Objective    Vitals:    07/13/25 1533   BP: (!) 146/96   Pulse: 63   Resp: 17   Temp: 36.8 °C (98.3 °F)   TempSrc: Oral   SpO2: 96%     No LMP for male patient.    Physical Exam  Unremarkable  Procedures    Point of Care Test & Imaging Results from this visit  Results for orders placed or performed in visit on 07/13/25   POCT SPOTFIRE R/ST Panel Mini w/COVID ([facility]) manually resulted    Specimen: Swab   Result Value Ref Range    POC Sars-Cov-2 PCR Negative Negative    POC Respiratory Syncytial Virus PCR Negative Negative    POC Influenza A Virus PCR Negative Negative    POC Influenza B Virus PCR Negative Negative    POC Human Rhinovirus PCR Negative Negative      Imaging  XR chest 2 views  Result Date: 7/13/2025  Bilateral reticular opacities, possible interstitial edema or pneumonia, with small right pleural effusion.   MACRO: None.   Signed by: Bettina Buchanan 7/13/2025 4:06 PM Dictation workstation:   [id number]      Cardiology, Vascular, and Other Imaging  No other  imaging results found for the past 2 days      Diagnostic study results (if any) were reviewed by LAURIE Ray.    Assessment/Plan   Allergies, medications, history, and pertinent labs/EKGs/Imaging reviewed by LAURIE Ray.     Medical Decision Making  Upon initial assessment, the patient was sitting calmly in the bedside chair in no acute/respiratory distress.  Entire physical examination is essentially benign other than the patient's subjective data.  Given his symptoms, a viral panel was ordered in which it was negative.  Two-view chest x-ray was ordered as well that revealed bilateral reticular opacities possible interstitial edema versus pneumonia with a small right pleural effusion.  Given his symptoms, age, comorbidities, reasonable to prescribe an antibiotic for pneumonia.  Doxycycline 100 mg oral tablet twice a day for 10 days has been sent.  Over-the-counter medications as discussed such as Zyrtec and Flonase.  Follow-up with your primary care provider.    As a result of the work-up, the patient was discharged home.  he was informed of his diagnosis and instructed to come back with any concerns or worsening of condition.  he and was agreeable to the plan as discussed above.  he was given the opportunity to ask questions.  All of the patient's questions were answered.    This document was generated using the assistance of voice recognition software. If there are any errors of spelling, grammar, syntax, or meaning; please feel free to contact me directly for clarification.     Orders and Diagnoses  Diagnoses and all orders for this visit:  Community acquired pneumonia of right lung, unspecified part of lung  -     doxycycline (Vibramycin) 100 mg capsule; Take 1 capsule (100 mg) by mouth 2 times a day for 10 days. Take with at least 8 ounces (large glass) of water, do not lie down for 30 minutes after  Upper respiratory infection with cough and congestion  -     XR chest 2 views;  Future  Suspected COVID-19 virus infection  -     POCT SPOTFIRE R/ST Panel Mini w/COVID ([facility]) manually resulted      Medical Admin Record      Patient disposition: Home    Electronically signed by LAURIE Ray  4:12 PM           [1] (Not in a hospital admission)   [2]   Past Medical History:  Diagnosis Date    Chronic obstructive pulmonary disease with (acute) exacerbation (Multi) 04/21/2020    Acute exacerbation of chronic obstructive pulmonary disease (COPD)    Gastro-esophageal reflux disease without esophagitis 02/16/2022    Gastroesophageal reflux disease without esophagitis    Old myocardial infarction     History of myocardial infarction    Personal history of diseases of the blood and blood-forming organs and certain disorders involving the immune mechanism     History of anemia    Personal history of other diseases of the circulatory system 12/24/2019    History of pulmonary hypertension    Personal history of other diseases of the digestive system     History of esophageal reflux    Personal history of other diseases of the musculoskeletal system and connective tissue     History of rheumatoid arthritis    Personal history of other diseases of the respiratory system 06/07/2018    History of acute pharyngitis    Personal history of other diseases of the respiratory system 06/07/2018    History of acute bronchitis    Personal history of other diseases of the respiratory system 06/07/2018    History of sore throat    Personal history of other endocrine, nutritional and metabolic disease 07/23/2020    History of hyperlipidemia    Personal history of peptic ulcer disease     History of peptic ulcer    Personal history of pneumonia (recurrent) 06/29/2022    History of pneumonia    Pulmonary hypertension, unspecified (Multi) 05/20/2021    Pulmonary hypertension, mild    Shortness of breath 06/29/2022    SOB (shortness of breath) on exertion   [3]   Past Surgical History:  Procedure Laterality  Date    CORONARY ANGIOPLASTY  03/12/2019    PTCA    ELBOW SURGERY  02/16/2017    Elbow Surgery    FOOT SURGERY  02/16/2017    Foot Surgery Right    HAND SURGERY  02/16/2017    Hand Surgery                                                                                                                                                          HERNIA REPAIR  02/16/2017    Hernia Repair    OTHER SURGICAL HISTORY  02/16/2017    Foot Surgery Left

## 2025-11-24 ENCOUNTER — APPOINTMENT (OUTPATIENT)
Dept: PRIMARY CARE | Facility: CLINIC | Age: 70
End: 2025-11-24
Payer: COMMERCIAL